# Patient Record
Sex: MALE | Race: WHITE | NOT HISPANIC OR LATINO | URBAN - NONMETROPOLITAN AREA
[De-identification: names, ages, dates, MRNs, and addresses within clinical notes are randomized per-mention and may not be internally consistent; named-entity substitution may affect disease eponyms.]

---

## 2017-09-05 ENCOUNTER — OFFICE VISIT (OUTPATIENT)
Dept: UROLOGY | Facility: CLINIC | Age: 42
End: 2017-09-05

## 2017-09-05 VITALS
WEIGHT: 267 LBS | DIASTOLIC BLOOD PRESSURE: 80 MMHG | TEMPERATURE: 97.2 F | HEIGHT: 68 IN | BODY MASS INDEX: 40.47 KG/M2 | SYSTOLIC BLOOD PRESSURE: 118 MMHG

## 2017-09-05 DIAGNOSIS — N40.1 BENIGN NON-NODULAR PROSTATIC HYPERPLASIA WITH LOWER URINARY TRACT SYMPTOMS: Primary | ICD-10-CM

## 2017-09-05 LAB
BILIRUB BLD-MCNC: NEGATIVE MG/DL
CLARITY, POC: CLEAR
COLOR UR: YELLOW
GLUCOSE UR STRIP-MCNC: NEGATIVE MG/DL
KETONES UR QL: NEGATIVE
LEUKOCYTE EST, POC: NEGATIVE
NITRITE UR-MCNC: NEGATIVE MG/ML
PH UR: 5.5 [PH] (ref 5–8)
PROT UR STRIP-MCNC: NEGATIVE MG/DL
RBC # UR STRIP: NEGATIVE /UL
SP GR UR: 1.02 (ref 1–1.03)
UROBILINOGEN UR QL: NORMAL

## 2017-09-05 PROCEDURE — 51798 US URINE CAPACITY MEASURE: CPT | Performed by: UROLOGY

## 2017-09-05 PROCEDURE — 81003 URINALYSIS AUTO W/O SCOPE: CPT | Performed by: UROLOGY

## 2017-09-05 PROCEDURE — 99204 OFFICE O/P NEW MOD 45 MIN: CPT | Performed by: UROLOGY

## 2017-09-05 PROCEDURE — 87086 URINE CULTURE/COLONY COUNT: CPT | Performed by: UROLOGY

## 2017-09-05 RX ORDER — TAMSULOSIN HYDROCHLORIDE 0.4 MG/1
1 CAPSULE ORAL DAILY
Qty: 30 CAPSULE | Refills: 11 | Status: SHIPPED | OUTPATIENT
Start: 2017-09-05

## 2017-09-05 NOTE — PROGRESS NOTES
Mr. Russell is 42 y.o. male    Chief Complaint   Patient presents with   • Urinary Frequency       Urinary Frequency    This is a new problem. The current episode started 1 to 4 weeks ago. The problem occurs intermittently. The problem has been unchanged. The patient is experiencing no pain. There has been no fever. He is sexually active. There is no history of pyelonephritis. Associated symptoms include frequency. Pertinent negatives include no flank pain, hematuria, nausea or vomiting. He has tried antibiotics for the symptoms. The treatment provided no relief. There is no history of kidney stones, recurrent UTIs or a urological procedure.       The following portions of the patient's history were reviewed and updated as appropriate: allergies, current medications, past family history, past medical history, past social history, past surgical history and problem list.    Review of Systems   Constitutional: Negative for appetite change and fever.   HENT: Negative for hearing loss and sore throat.    Eyes: Negative for pain and redness.   Respiratory: Negative for cough and shortness of breath.    Cardiovascular: Negative for chest pain and leg swelling.   Gastrointestinal: Negative for anal bleeding, nausea and vomiting.   Endocrine: Negative for cold intolerance and heat intolerance.   Genitourinary: Positive for frequency. Negative for dysuria, flank pain and hematuria.   Musculoskeletal: Negative for joint swelling and myalgias.   Skin: Negative for color change and rash.   Allergic/Immunologic: Negative for immunocompromised state.   Neurological: Negative for dizziness and speech difficulty.   Hematological: Negative for adenopathy. Does not bruise/bleed easily.   Psychiatric/Behavioral: Negative for dysphoric mood and suicidal ideas.         Current Outpatient Prescriptions:   •  metFORMIN (GLUCOPHAGE) 500 MG tablet, Take 500 mg by mouth 2 (Two) Times a Day With Meals., Disp: , Rfl:   •  tamsulosin (FLOMAX)  "0.4 MG capsule 24 hr capsule, Take 1 capsule by mouth Daily., Disp: 30 capsule, Rfl: 11    Past Medical History:   Diagnosis Date   • Diabetes mellitus    • Environmental allergies        History reviewed. No pertinent surgical history.    Social History     Social History   • Marital status:      Spouse name: N/A   • Number of children: N/A   • Years of education: N/A     Social History Main Topics   • Smoking status: Never Smoker   • Smokeless tobacco: Never Used   • Alcohol use No   • Drug use: None   • Sexual activity: Not Asked     Other Topics Concern   • None     Social History Narrative       Family History   Problem Relation Age of Onset   • No Known Problems Father    • No Known Problems Mother        Objective    /80  Temp 97.2 °F (36.2 °C)  Ht 68\" (172.7 cm)  Wt 267 lb (121 kg)  BMI 40.6 kg/m2    Physical Exam   Constitutional: He is oriented to person, place, and time. He appears well-developed and well-nourished. No distress.   HENT:   Nose: Nose normal.   Neck: Normal range of motion. Neck supple. No tracheal deviation present. No thyromegaly present.   Cardiovascular: Normal rate, regular rhythm and intact distal pulses.    No significant edema or tenderness    Pulmonary/Chest: Breath sounds normal. No accessory muscle usage. No respiratory distress.   Abdominal: Soft. Bowel sounds are normal. He exhibits no distension, no ascites and no mass. There is no hepatosplenomegaly. There is no tenderness. There is no rebound, no guarding and no CVA tenderness. No hernia.   Stool specimen is not indicated for my portion of the exam   Genitourinary: Penis normal. Rectal exam shows no mass, no tenderness, anal tone normal and guaiac negative stool. Tender: no nodules. Right testis shows no mass, no swelling and no tenderness. Left testis shows swelling (hydrocele). Left testis shows no mass and no tenderness. No penile tenderness (no lesion or deformities).   Genitourinary Comments:  The " urethral meatus normal in position without evidence of stricture. Epididymis without mass or tenderness. Vas Deferens is palpably normal.Anus and perineum without mass or tenderness. The seminal vesicle are without mass or enlargement. The prostate is approximately 40 ml. It is Symmetric, with a Soft consistency. There are no nodules present. .      Lymphadenopathy:     He has no cervical adenopathy. No inguinal adenopathy noted on the right or left side.        Right: No inguinal adenopathy present.        Left: No inguinal adenopathy present.   Neurological: He is alert and oriented to person, place, and time.   Skin: Skin is warm and dry. No rash noted. He is not diaphoretic. No pallor.   Psychiatric: He has a normal mood and affect. His behavior is normal.   Vitals reviewed.      Admission on 07/28/2017, Discharged on 07/28/2017   Component Date Value Ref Range Status   • Color 07/28/2017 Dark Yellow  Yellow, Straw, Dark Yellow, Esperanza Final   • Clarity, UA 07/28/2017 Clear  Clear Final   • Glucose, UA 07/28/2017 Negative  Negative, 1000 mg/dL (3+) mg/dL Final   • Bilirubin 07/28/2017 Small (1+)* Negative Final   • Ketones, UA 07/28/2017 Negative  Negative Final   • Specific Gravity  07/28/2017 1.030  1.005 - 1.030 Final   • Blood, UA 07/28/2017 Negative  Negative Final   • pH, Urine 07/28/2017 5.0  5.0 - 8.0 Final   • Protein, POC 07/28/2017 Trace* Negative mg/dL Final   • Urobilinogen, UA 07/28/2017 Normal  Normal Final   • Leukocytes 07/28/2017 Negative  Negative Final   • Nitrite, UA 07/28/2017 Negative  Negative Final       Results for orders placed or performed in visit on 09/05/17   POC Urinalysis Dipstick, Automated   Result Value Ref Range    Color Yellow Yellow, Straw, Dark Yellow, Esperanza    Clarity, UA Clear Clear    Glucose, UA Negative Negative, 1000 mg/dL (3+) mg/dL    Bilirubin Negative Negative    Ketones, UA Negative Negative    Specific Gravity  1.025 1.005 - 1.030    Blood, UA Negative Negative     pH, Urine 5.5 5.0 - 8.0    Protein, POC Negative Negative mg/dL    Urobilinogen, UA Normal Normal    Leukocytes Negative Negative    Nitrite, UA Negative Negative   Bladder Scan interpretation  Estimation of residual urine via abdominal ultrasound  Residual Urine: 0 ml  Indication: frequency  Position: Supine  Examination: Incremental scanning of the suprapubic area using 3 MHz transducer using copious amounts of acoustic gel.   Findings: An anechoic area was demonstrated which represented the bladder, with measurement of residual urine as noted. I inspected this myself. In that the residual urine was stable or insignificant, no treatment will be necessary at this time.       Assessment and Plan    Roberto was seen today for urinary frequency.    Diagnoses and all orders for this visit:    Benign non-nodular prostatic hyperplasia with lower urinary tract symptoms  -     POC Urinalysis Dipstick, Automated  -     tamsulosin (FLOMAX) 0.4 MG capsule 24 hr capsule; Take 1 capsule by mouth Daily.  -     Urine Culture    I reviewed his records from his urgent care visit.  He is having significant lower urinary tract symptoms.  Urinalysis was negative for infection.  He was started on antibiotics under the presumed diagnosis of prostatitis.    Urinalysis is negative today.  Urinalysis was negative at his urgent care visit.  I do not think that he had a diagnosis of acute prostatitis and the absence of positive urine culture or leukocyte esterase positive urinalysis.  I believe more than likely what he has is an enlarging prostate.  We discussed options and he is interested in starting Flomax.  I have written her prescription for this today.  I will also send a urine culture today.  He does states that he had an episode of erectile dysfunction last night.  Prior to this, he had never had problems with erections.  As this was a one-time incident, plan to continue to monitor.    He and I discussed BPH today including the  pathophysiology, diagnosis, and management. The role of PSA in management of PSA is discussed.  The patient understands the purpose of a uroflow, post void residual and the need for cystoscopy. We discussed the role of Alpha blockers, 5-Alpha redcuctase inhibitors, and anticholinergics. Minimally invasive techniques including TUNA, TUMT, Interstitial laser, and WIT are considered. TUIP, TURP, & Laser ablation are also explained as more invasive techniques. TURP is acknowledged to be the gold standard for surgical management. Behavioral, dietary, and herbal therapy are also discussed pointing out the limited available data for the latter. Based upon his symptomatology, we have elected to begin a trial of alpha blockade. The risks of orthostasis, central mediated dizziness, ejaculatory dysfunction, reflux, & rhinitis are discussed with the patient.

## 2017-09-07 LAB — BACTERIA SPEC AEROBE CULT: NORMAL

## 2019-10-14 ENCOUNTER — OFFICE VISIT (OUTPATIENT)
Dept: URGENT CARE | Facility: PHYSICIAN GROUP | Age: 44
End: 2019-10-14
Payer: COMMERCIAL

## 2019-10-14 VITALS
HEART RATE: 99 BPM | TEMPERATURE: 97 F | HEIGHT: 66 IN | BODY MASS INDEX: 41.78 KG/M2 | WEIGHT: 260 LBS | DIASTOLIC BLOOD PRESSURE: 84 MMHG | RESPIRATION RATE: 16 BRPM | SYSTOLIC BLOOD PRESSURE: 142 MMHG | OXYGEN SATURATION: 94 %

## 2019-10-14 DIAGNOSIS — L08.9 SOFT TISSUE INFECTION: ICD-10-CM

## 2019-10-14 PROCEDURE — 99203 OFFICE O/P NEW LOW 30 MIN: CPT | Performed by: FAMILY MEDICINE

## 2019-10-14 RX ORDER — ACETAMINOPHEN 500 MG
500-1000 TABLET ORAL EVERY 6 HOURS PRN
COMMUNITY
End: 2020-02-28

## 2019-10-14 RX ORDER — DOXYCYCLINE HYCLATE 100 MG
100 TABLET ORAL 2 TIMES DAILY
Qty: 14 TAB | Refills: 0 | Status: SHIPPED | OUTPATIENT
Start: 2019-10-14 | End: 2019-10-21

## 2019-10-14 ASSESSMENT — ENCOUNTER SYMPTOMS
EYE REDNESS: 0
EYE DISCHARGE: 0
WEIGHT LOSS: 0
MYALGIAS: 0
NAUSEA: 0
VOMITING: 0

## 2019-10-14 NOTE — PROGRESS NOTES
"Subjective:      Ji Montoya is a 44 y.o. male who presents with Facial Swelling (L [id number] )            3 days left facial swelling.  There is a red bump ? Ingrown hair. No drainage.  No toothache.  No fever.  No eye involvement. No rash. Sx's are moderate severity and progressively worse. No other aggravating or alleviating factors.        Review of Systems   Constitutional: Negative for malaise/fatigue and weight loss.   Eyes: Negative for discharge and redness.   Gastrointestinal: Negative for nausea and vomiting.   Musculoskeletal: Negative for joint pain and myalgias.   Skin: Negative for itching and rash.     .  Medications, Allergies, and current problem list reviewed today in Epic       Objective:     /84   Pulse 99   Temp 36.1 °C (97 °F) (Temporal)   Resp 16   Ht 1.676 m (5' 6\")   Wt 117.9 kg (260 lb)   SpO2 94%   BMI 41.97 kg/m²      Physical Exam   Constitutional: He is oriented to person, place, and time. He appears well-developed and well-nourished. No distress.   HENT:   Head: Normocephalic and atraumatic.       Eyes: Conjunctivae are normal.   Cardiovascular: Normal rate, regular rhythm and normal heart sounds.   No murmur heard.  Pulmonary/Chest: Effort normal and breath sounds normal. He has no wheezes.   Neurological: He is alert and oriented to person, place, and time.   Skin: Skin is warm and dry. No rash noted.             Assessment/Plan:     1. Soft tissue infection  doxycycline (VIBRAMYCIN) 100 MG Tab     Differential diagnosis, natural history, supportive care, and indications for immediate follow-up discussed at length.       "

## 2019-10-14 NOTE — LETTER
October 14, 2019         Patient: Ji Montoya   YOB: 1975   Date of Visit: 10/14/2019           To Whom it May Concern:    Ji Montoya was seen in my clinic on 10/14/2019. Please excuse today.       Sincerely,           Benedicto Baltazar M.D.  Electronically Signed

## 2019-12-16 ENCOUNTER — OFFICE VISIT (OUTPATIENT)
Dept: URGENT CARE | Facility: PHYSICIAN GROUP | Age: 44
End: 2019-12-16
Payer: COMMERCIAL

## 2019-12-16 ENCOUNTER — HOSPITAL ENCOUNTER (OUTPATIENT)
Dept: RADIOLOGY | Facility: MEDICAL CENTER | Age: 44
End: 2019-12-16
Attending: PHYSICIAN ASSISTANT
Payer: COMMERCIAL

## 2019-12-16 VITALS
RESPIRATION RATE: 18 BRPM | WEIGHT: 260 LBS | HEIGHT: 66 IN | OXYGEN SATURATION: 98 % | DIASTOLIC BLOOD PRESSURE: 82 MMHG | HEART RATE: 78 BPM | SYSTOLIC BLOOD PRESSURE: 118 MMHG | TEMPERATURE: 97.5 F | BODY MASS INDEX: 41.78 KG/M2

## 2019-12-16 DIAGNOSIS — R51.9 ACUTE NONINTRACTABLE HEADACHE, UNSPECIFIED HEADACHE TYPE: ICD-10-CM

## 2019-12-16 PROCEDURE — 70450 CT HEAD/BRAIN W/O DYE: CPT

## 2019-12-16 PROCEDURE — 99214 OFFICE O/P EST MOD 30 MIN: CPT | Performed by: PHYSICIAN ASSISTANT

## 2019-12-16 RX ORDER — KETOROLAC TROMETHAMINE 30 MG/ML
60 INJECTION, SOLUTION INTRAMUSCULAR; INTRAVENOUS ONCE
Status: COMPLETED | OUTPATIENT
Start: 2019-12-16 | End: 2019-12-16

## 2019-12-16 RX ORDER — NAPROXEN 500 MG/1
500 TABLET ORAL EVERY 6 HOURS PRN
Qty: 30 TAB | Refills: 0 | Status: SHIPPED | OUTPATIENT
Start: 2019-12-16 | End: 2020-02-28

## 2019-12-16 RX ADMIN — KETOROLAC TROMETHAMINE 60 MG: 30 INJECTION, SOLUTION INTRAMUSCULAR; INTRAVENOUS at 12:37

## 2019-12-16 ASSESSMENT — ENCOUNTER SYMPTOMS
SENSORY CHANGE: 0
SPEECH CHANGE: 0
ABDOMINAL PAIN: 0
COUGH: 0
BRUISES/BLEEDS EASILY: 0
BLURRED VISION: 0
WEAKNESS: 0
SEIZURES: 0
SPUTUM PRODUCTION: 0
VOMITING: 0
FEVER: 0
DIARRHEA: 0
FOCAL WEAKNESS: 0
CHILLS: 0
NAUSEA: 0
TINGLING: 0
DOUBLE VISION: 0
LOSS OF CONSCIOUSNESS: 0
SHORTNESS OF BREATH: 0
DIZZINESS: 0
HEADACHES: 1
WHEEZING: 0
TREMORS: 0

## 2019-12-16 ASSESSMENT — VISUAL ACUITY: OU: 1

## 2019-12-16 NOTE — PROGRESS NOTES
"Subjective:   Ji Montoya  is a 44 y.o. male who presents for Headache (x1wks soreness, throbbing, in the back of head, hx of head trama)        Headache    This is a new problem. The current episode started in the past 7 days. The problem occurs constantly. The problem has been waxing and waning. The pain is located in the occipital region. The pain does not radiate. Pertinent negatives include no abdominal pain, blurred vision, coughing, dizziness, fever, hearing loss, nausea, seizures, tingling, tinnitus, vomiting or weakness.     Patient comes clinic complaining of pain from headache on left occiput times last 1 week.  He denies treatments tried.  Denies over-the-counter medicines tried.  Denies feeling otherwise sick.  Denies fevers chills or nausea vomiting.  Denies dizziness or visual changes.  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  He notes he did have one severe injury where a horse fell on him across his torso.  He had significant rib fractures and reports having to be \"opened up on the back of my head\" to help with the swelling.  Patient denies any numbness tingling or weakness.  Denies any radiation of headache.  Notes focal location of pain to left occipital head.  Notes some relief with pushing pressure with his left hand in this area.  Denies feeling otherwise ill.  Denies cough sore throat or ear pain.  He notes past medical history of sinus congestion and seasonal allergies this time of year and suspects may be contributory.  Has tried no over-the-counter medicines because he \"did not want to mask anything abnormal with my brain\".    Review of Systems   Constitutional: Negative for chills, fever and malaise/fatigue.   HENT: Positive for congestion. Negative for hearing loss and tinnitus.    Eyes: Negative for blurred vision and double vision.   Respiratory: Negative for cough, sputum production, shortness of breath " "and wheezing.    Cardiovascular: Negative for chest pain.   Gastrointestinal: Negative for abdominal pain, diarrhea, nausea and vomiting.   Skin: Negative for rash.   Neurological: Positive for headaches. Negative for dizziness, tingling, tremors, sensory change, speech change, focal weakness, seizures, loss of consciousness and weakness.   Endo/Heme/Allergies: Positive for environmental allergies. Does not bruise/bleed easily.     No Known Allergies   I have worn a mask for the entire encounter with this patient.      Objective:   /82   Pulse 78   Temp 36.4 °C (97.5 °F) (Temporal)   Resp 18   Ht 1.676 m (5' 6\")   Wt 117.9 kg (260 lb)   SpO2 98%   BMI 41.97 kg/m²   Physical Exam  Vitals signs and nursing note reviewed.   Constitutional:       General: He is not in acute distress.     Appearance: He is well-developed. He is not diaphoretic.   HENT:      Head: Normocephalic and atraumatic.      Right Ear: Tympanic membrane, ear canal and external ear normal.      Left Ear: Tympanic membrane, ear canal and external ear normal.      Nose: Nose normal.      Mouth/Throat:      Pharynx: Uvula midline. Posterior oropharyngeal erythema ( mild PND) present. No oropharyngeal exudate.      Tonsils: No tonsillar abscesses.   Eyes:      General: Lids are normal. Vision grossly intact. Gaze aligned appropriately. No scleral icterus.        Right eye: No discharge or hordeolum.         Left eye: No discharge or hordeolum.      Extraocular Movements:      Right eye: Nystagmus present.      Left eye: Nystagmus present.      Conjunctiva/sclera: Conjunctivae normal.      Right eye: Right conjunctiva is not injected. No exudate or hemorrhage.     Left eye: Left conjunctiva is not injected. No exudate or hemorrhage.     Pupils: Pupils are equal, round, and reactive to light.   Neck:      Musculoskeletal: Neck supple.   Pulmonary:      Effort: Pulmonary effort is normal. No respiratory distress.      Breath sounds: No " decreased breath sounds, wheezing, rhonchi or rales.   Musculoskeletal: Normal range of motion.   Lymphadenopathy:      Cervical: Cervical adenopathy ( mild bilat) present.   Skin:     General: Skin is warm and dry.      Coloration: Skin is not pale.   Neurological:      Mental Status: He is alert and oriented to person, place, and time. He is not disoriented.      GCS: GCS eye subscore is 4. GCS verbal subscore is 5. GCS motor subscore is 6.      Cranial Nerves: Cranial nerves are intact. No cranial nerve deficit.      Sensory: Sensation is intact. No sensory deficit.      Motor: Motor function is intact.      Coordination: Coordination is intact. Coordination normal.      Gait: Gait is intact. Gait normal.     CT head  -   IMPRESSION:     Head CT without contrast within normal limits. No evidence of acute cerebral infarction, hemorrhage or mass lesion.             Last Resulted: 12/16/19 12:08 PM           Toradol 60 IM-tolerates well      Assessment/Plan:   1. Acute nonintractable headache, unspecified headache type  - CT-HEAD W/O; Future  - ketorolac (TORADOL) injection 60 mg  - naproxen (NAPROSYN) 500 MG Tab; Take 1 Tab by mouth every 6 hours as needed (pain or headache).  Dispense: 30 Tab; Refill: 0  - REFERRAL TO FAMILY PRACTICE  Recommend conservative care, rest, toradol and Rx naproxen, tylenol OTC -trial of treatment medications for headache-patient encouraged to observe for improvement of headache or persistence, encouraged follow-up with primary care return to clinic with worsening    Return to clinic with lack of resolution or progression of symptoms.    ER precautions with any worsening symptoms are reviewed with patient/caregiver and they do express understanding    Differential diagnosis, natural history, supportive care, and indications for immediate follow-up discussed.

## 2020-02-02 ENCOUNTER — HOSPITAL ENCOUNTER (OUTPATIENT)
Dept: RADIOLOGY | Facility: MEDICAL CENTER | Age: 45
End: 2020-02-02
Attending: NURSE PRACTITIONER
Payer: COMMERCIAL

## 2020-02-02 ENCOUNTER — OFFICE VISIT (OUTPATIENT)
Dept: URGENT CARE | Facility: PHYSICIAN GROUP | Age: 45
End: 2020-02-02
Payer: COMMERCIAL

## 2020-02-02 VITALS
HEART RATE: 102 BPM | TEMPERATURE: 97.5 F | BODY MASS INDEX: 40.82 KG/M2 | HEIGHT: 66 IN | OXYGEN SATURATION: 97 % | DIASTOLIC BLOOD PRESSURE: 94 MMHG | RESPIRATION RATE: 18 BRPM | WEIGHT: 254 LBS | SYSTOLIC BLOOD PRESSURE: 154 MMHG

## 2020-02-02 DIAGNOSIS — R05.9 COUGH: ICD-10-CM

## 2020-02-02 DIAGNOSIS — J18.9 PNEUMONIA OF RIGHT LOWER LOBE DUE TO INFECTIOUS ORGANISM: ICD-10-CM

## 2020-02-02 DIAGNOSIS — J45.41 MODERATE PERSISTENT ASTHMA WITH EXACERBATION: ICD-10-CM

## 2020-02-02 DIAGNOSIS — R06.02 SHORTNESS OF BREATH: ICD-10-CM

## 2020-02-02 PROCEDURE — 99214 OFFICE O/P EST MOD 30 MIN: CPT | Mod: 25 | Performed by: NURSE PRACTITIONER

## 2020-02-02 PROCEDURE — 71046 X-RAY EXAM CHEST 2 VIEWS: CPT

## 2020-02-02 PROCEDURE — 94640 AIRWAY INHALATION TREATMENT: CPT | Performed by: NURSE PRACTITIONER

## 2020-02-02 RX ORDER — DEXTROMETHORPHAN HYDROBROMIDE AND PROMETHAZINE HYDROCHLORIDE 15; 6.25 MG/5ML; MG/5ML
5 SYRUP ORAL EVERY 4 HOURS PRN
Qty: 100 ML | Refills: 0 | Status: SHIPPED | OUTPATIENT
Start: 2020-02-02 | End: 2020-02-09

## 2020-02-02 RX ORDER — ALBUTEROL SULFATE 90 UG/1
2 AEROSOL, METERED RESPIRATORY (INHALATION) EVERY 4 HOURS PRN
Qty: 1 INHALER | Refills: 0 | Status: SHIPPED | OUTPATIENT
Start: 2020-02-02 | End: 2020-02-16

## 2020-02-02 RX ORDER — ALBUTEROL SULFATE 2.5 MG/3ML
2.5 SOLUTION RESPIRATORY (INHALATION) ONCE
Status: COMPLETED | OUTPATIENT
Start: 2020-02-02 | End: 2020-02-02

## 2020-02-02 RX ORDER — PREDNISONE 20 MG/1
40 TABLET ORAL DAILY
Qty: 10 TAB | Refills: 0 | Status: SHIPPED | OUTPATIENT
Start: 2020-02-02 | End: 2020-02-07

## 2020-02-02 RX ORDER — DOXYCYCLINE HYCLATE 100 MG
100 TABLET ORAL 2 TIMES DAILY
Qty: 14 TAB | Refills: 0 | Status: SHIPPED | OUTPATIENT
Start: 2020-02-02 | End: 2020-02-09

## 2020-02-02 RX ADMIN — ALBUTEROL SULFATE 2.5 MG: 2.5 SOLUTION RESPIRATORY (INHALATION) at 11:20

## 2020-02-02 ASSESSMENT — ENCOUNTER SYMPTOMS
CHILLS: 0
DOUBLE VISION: 0
COUGH: 1
WHEEZING: 1
NAUSEA: 0
SHORTNESS OF BREATH: 1
HEARTBURN: 0
PALPITATIONS: 0
FEVER: 0
SPUTUM PRODUCTION: 1
BACK PAIN: 0
MYALGIAS: 0
ABDOMINAL PAIN: 0
HEMOPTYSIS: 0
BLURRED VISION: 0
VOMITING: 0
SORE THROAT: 0

## 2020-02-02 NOTE — PROGRESS NOTES
Subjective:     Ji Montoya is a 44 y.o. male who presents for Cough ([id number] )      HPI  Pt presents for evaluation of a new problem. He states he has been feeling ill for a week. His symptoms include SOB, productive brown/gree/yellow cough, dyspnea upon excertion, headache and congestion. He has tried using dayquil for his symptoms however this has provided no relief. His symptoms are progressively worsening. He has a 3 pack/day smoking history as well as Asthma which is untreated. He states he usually gets pneumonia every year requiring a hospital stay.     Review of Systems   Constitutional: Positive for malaise/fatigue. Negative for chills and fever.   HENT: Positive for congestion. Negative for ear discharge, ear pain and sore throat.    Eyes: Negative for blurred vision and double vision.   Respiratory: Positive for cough, sputum production, shortness of breath and wheezing. Negative for hemoptysis.    Cardiovascular: Positive for chest pain. Negative for palpitations.        Chest tenderness with coughing only   Gastrointestinal: Negative for abdominal pain, heartburn, nausea and vomiting.   Genitourinary: Negative for dysuria, frequency and urgency.   Musculoskeletal: Negative for back pain and myalgias.   Skin: Negative for itching and rash.   All other systems reviewed and are negative.      PMH: No past medical history on file.  ALLERGIES: No Known Allergies  SURGHX: No past surgical history on file.  SOCHX:   Social History     Socioeconomic History   • Marital status:      Spouse name: Not on file   • Number of children: Not on file   • Years of education: Not on file   • Highest education level: Not on file   Occupational History   • Not on file   Social Needs   • Financial resource strain: Not on file   • Food insecurity:     Worry: Not on file     Inability: Not on file   • Transportation needs:     Medical: Not on file     Non-medical: Not on file   Tobacco Use   • Smoking  "status: Former Smoker     Types: Cigarettes   • Smokeless tobacco: Former User     Quit date: 2016   • Tobacco comment: quit 8 years ago    Substance and Sexual Activity   • Alcohol use: Not Currently   • Drug use: Not on file   • Sexual activity: Not on file   Lifestyle   • Physical activity:     Days per week: Not on file     Minutes per session: Not on file   • Stress: Not on file   Relationships   • Social connections:     Talks on phone: Not on file     Gets together: Not on file     Attends [employer] service: Not on file     Active member of club or organization: Not on file     Attends meetings of clubs or organizations: Not on file     Relationship status: Not on file   • Intimate partner violence:     Fear of current or ex partner: Not on file     Emotionally abused: Not on file     Physically abused: Not on file     Forced sexual activity: Not on file   Other Topics Concern   • Not on file   Social History Narrative   • Not on file     FH: No family history on file.      Objective:   /94   Pulse (!) 102   Temp 36.4 °C (97.5 °F) (Temporal)   Resp 18   Ht 1.676 m (5' 6\")   Wt 115.2 kg (254 lb)   SpO2 97%   BMI 41.00 kg/m²     Physical Exam  Vitals signs and nursing note reviewed.   Constitutional:       General: He is not in acute distress.     Appearance: He is ill-appearing.   HENT:      Head: Normocephalic and atraumatic.      Right Ear: Tympanic membrane normal.      Left Ear: Tympanic membrane normal.      Nose: Congestion and rhinorrhea present.      Mouth/Throat:      Mouth: Mucous membranes are moist.      Pharynx: No oropharyngeal exudate or posterior oropharyngeal erythema.   Eyes:      Extraocular Movements: Extraocular movements intact.      Conjunctiva/sclera: Conjunctivae normal.      Pupils: Pupils are equal, round, and reactive to light.   Neck:      Musculoskeletal: No neck rigidity or muscular tenderness.   Cardiovascular:      Rate and Rhythm: Normal rate and regular rhythm. "      Heart sounds: Normal heart sounds.   Pulmonary:      Effort: Tachypnea and prolonged expiration present.      Breath sounds: Decreased air movement and transmitted upper airway sounds present. Examination of the right-upper field reveals wheezing and rhonchi. Examination of the left-upper field reveals wheezing and rhonchi. Examination of the right-middle field reveals wheezing and rhonchi. Examination of the left-middle field reveals wheezing and rhonchi. Examination of the right-lower field reveals decreased breath sounds, wheezing and rhonchi. Examination of the left-lower field reveals decreased breath sounds, wheezing and rhonchi. Decreased breath sounds, wheezing and rhonchi present.      Comments: Wheezing and rhonci improved following albuterol neb in office however pt remains wheezing in bilateral upper lungs.   Chest:      Chest wall: Tenderness present.   Abdominal:      General: Abdomen is flat. There is no distension.      Palpations: Abdomen is soft.      Tenderness: There is no tenderness.   Musculoskeletal: Normal range of motion.   Lymphadenopathy:      Cervical: Cervical adenopathy present.   Skin:     General: Skin is warm and dry.      Capillary Refill: Capillary refill takes less than 2 seconds.   Neurological:      General: No focal deficit present.      Mental Status: He is alert and oriented to person, place, and time. Mental status is at baseline.   Psychiatric:         Mood and Affect: Mood normal.         Behavior: Behavior normal.         Judgment: Judgment normal.       DX chest 2 views: FINDINGS:  Low lung volume. Elevation of the right hemidiaphragm.  Patchy right basilar opacity.  No pleural effusions, no pneumothorax are appreciated.  Normal cardiopericardial silhouette.        IMPRESSION:        1. Low lung volume with hypoventilatory change.     2. Elevation of the right hemidiaphragm and right basilar opacity, likely atelectasis.  Assessment/Plan:   Assessment      1.  Shortness of breath  - albuterol (PROVENTIL) 2.5mg/3ml nebulizer solution 2.5 mg  - DX-CHEST-2 VIEWS; Future  - albuterol 108 (90 Base) MCG/ACT Aero Soln inhalation aerosol; Inhale 2 Puffs by mouth every four hours as needed for Shortness of Breath for up to 14 days.  Dispense: 1 Inhaler; Refill: 0  - promethazine-dextromethorphan (PROMETHAZINE-DM) 6.25-15 MG/5ML syrup; Take 5 mL by mouth every four hours as needed for Cough for up to 7 days.  Dispense: 100 mL; Refill: 0  - predniSONE (DELTASONE) 20 MG Tab; Take 2 Tabs by mouth every day for 5 days.  Dispense: 10 Tab; Refill: 0    2. Cough  - predniSONE (DELTASONE) 20 MG Tab; Take 2 Tabs by mouth every day for 5 days.  Dispense: 10 Tab; Refill: 0    We discussed supportive measures including humidifier, warm salt water gargles, over-the-counter Cepacol throat lozenges, rest  and increased fluids. He was encouraged to seek treatment back in the ER or urgent care for worsening symptoms,  fever greater than 101, worsening wheezes or shortness of breath. He is in agreement with his plan of care today.

## 2020-02-02 NOTE — LETTER
February 2, 2020         Patient: Ji Montoya   YOB: 1975   Date of Visit: 2/2/2020           To Whom it May Concern:    Ji Montoya was seen in my clinic on 2/2/2020. He may return to school on 02/04/2020.    If you have any questions or concerns, please don't hesitate to call.        Sincerely,           ANGIE Oviedo.  Electronically Signed

## 2020-02-02 NOTE — LETTER
February 2, 2020    To Whom It May Concern:         This is confirmation that Ji Jan attended his scheduled appointment with BHARAT Oviedo on 2/02/20. He may return to work 02/05/2020 or sooner if better.          If you have any questions please do not hesitate to call me at the phone number listed below.    Sincerely,          ANGIE Oviedo.  [phone]

## 2020-02-05 ENCOUNTER — OFFICE VISIT (OUTPATIENT)
Dept: URGENT CARE | Facility: PHYSICIAN GROUP | Age: 45
End: 2020-02-05
Payer: COMMERCIAL

## 2020-02-05 VITALS
TEMPERATURE: 98.8 F | BODY MASS INDEX: 40.82 KG/M2 | RESPIRATION RATE: 16 BRPM | HEART RATE: 107 BPM | OXYGEN SATURATION: 97 % | WEIGHT: 254 LBS | SYSTOLIC BLOOD PRESSURE: 108 MMHG | DIASTOLIC BLOOD PRESSURE: 64 MMHG | HEIGHT: 66 IN

## 2020-02-05 DIAGNOSIS — J18.9 PNEUMONIA OF RIGHT LOWER LOBE DUE TO INFECTIOUS ORGANISM: ICD-10-CM

## 2020-02-05 PROCEDURE — 94640 AIRWAY INHALATION TREATMENT: CPT | Performed by: PHYSICIAN ASSISTANT

## 2020-02-05 PROCEDURE — 99214 OFFICE O/P EST MOD 30 MIN: CPT | Mod: 25 | Performed by: PHYSICIAN ASSISTANT

## 2020-02-05 RX ORDER — IPRATROPIUM BROMIDE AND ALBUTEROL SULFATE 2.5; .5 MG/3ML; MG/3ML
3 SOLUTION RESPIRATORY (INHALATION) ONCE
Status: COMPLETED | OUTPATIENT
Start: 2020-02-05 | End: 2020-02-05

## 2020-02-05 RX ORDER — AMOXICILLIN AND CLAVULANATE POTASSIUM 875; 125 MG/1; MG/1
1 TABLET, FILM COATED ORAL 2 TIMES DAILY
Qty: 10 TAB | Refills: 0 | Status: SHIPPED | OUTPATIENT
Start: 2020-02-05 | End: 2020-02-10

## 2020-02-05 RX ORDER — BENZONATATE 100 MG/1
100 CAPSULE ORAL 3 TIMES DAILY PRN
COMMUNITY
Start: 2015-05-14 | End: 2020-04-06

## 2020-02-05 RX ADMIN — IPRATROPIUM BROMIDE AND ALBUTEROL SULFATE 3 ML: 2.5; .5 SOLUTION RESPIRATORY (INHALATION) at 16:54

## 2020-02-06 ASSESSMENT — ENCOUNTER SYMPTOMS
HEMOPTYSIS: 0
COUGH: 1
ABDOMINAL PAIN: 0
SORE THROAT: 1
MUSCULOSKELETAL NEGATIVE: 1
DIARRHEA: 0
SHORTNESS OF BREATH: 1
SPUTUM PRODUCTION: 0
WHEEZING: 1
FEVER: 0
NAUSEA: 0
CHILLS: 1
EYE REDNESS: 0
VOMITING: 0
EYE DISCHARGE: 0
MYALGIAS: 0
DIZZINESS: 0

## 2020-02-06 ASSESSMENT — COPD QUESTIONNAIRES: COPD: 1

## 2020-02-06 NOTE — PROGRESS NOTES
"Subjective:      Ji Montoya is a 44 y.o. male who presents with Cough (follow up pneumonia, still coughing)            Cough   This is a new problem. The current episode started 1 to 4 weeks ago (10 days). The problem has been unchanged. The problem occurs every few minutes. The cough is non-productive. Associated symptoms include chills, nasal congestion, postnasal drip, a sore throat, shortness of breath and wheezing. Pertinent negatives include no chest pain, ear pain, eye redness, fever, hemoptysis, myalgias or rash. The symptoms are aggravated by lying down. He has tried prescription cough suppressant and a beta-agonist inhaler for the symptoms. The treatment provided mild relief. His past medical history is significant for COPD and pneumonia. There is no history of asthma or bronchitis.     Patient was seen in urgent care 3 days ago and diagnosed with pneumonia, treated with a course of doxycycline and prednisone. He returns today reporting ongoing coughing and SOB. No recent fevers, chills, body aches, chest pain, productive cough, or hemoptysis. He reports he usually gets pneumonia once a year. He is a former smoker, quit many years ago.     Review of Systems   Constitutional: Positive for chills. Negative for fever.   HENT: Positive for congestion, postnasal drip and sore throat. Negative for ear pain.    Eyes: Negative for discharge and redness.   Respiratory: Positive for cough, shortness of breath and wheezing. Negative for hemoptysis and sputum production.    Cardiovascular: Negative for chest pain.   Gastrointestinal: Negative for abdominal pain, diarrhea, nausea and vomiting.   Genitourinary: Negative.    Musculoskeletal: Negative.  Negative for myalgias.   Skin: Negative for rash.   Neurological: Negative for dizziness.        Objective:     /64   Pulse (!) 107   Temp 37.1 °C (98.8 °F)   Resp 16   Ht 1.676 m (5' 6\")   Wt 115.2 kg (254 lb)   SpO2 97%   BMI 41.00 kg/m²      Physical " Exam  Vitals signs and nursing note reviewed.   Constitutional:       Appearance: Normal appearance. He is well-developed. He is not ill-appearing.   HENT:      Head: Normocephalic and atraumatic.      Right Ear: Hearing, tympanic membrane, ear canal and external ear normal. There is no impacted cerumen.      Left Ear: Hearing, tympanic membrane, ear canal and external ear normal. There is no impacted cerumen.      Nose: Rhinorrhea present. No congestion.      Mouth/Throat:      Mouth: Mucous membranes are moist.      Pharynx: Uvula midline. No oropharyngeal exudate or posterior oropharyngeal erythema.   Eyes:      General:         Right eye: No discharge.         Left eye: No discharge.      Conjunctiva/sclera: Conjunctivae normal.      Pupils: Pupils are equal, round, and reactive to light.   Neck:      Musculoskeletal: Normal range of motion.   Cardiovascular:      Rate and Rhythm: Normal rate and regular rhythm.      Heart sounds: Normal heart sounds. No murmur.   Pulmonary:      Effort: Pulmonary effort is normal.      Breath sounds: Wheezing present. No rales.      Comments: Dry cough present throughout exam  Musculoskeletal: Normal range of motion.   Skin:     General: Skin is warm and dry.   Neurological:      Mental Status: He is alert and oriented to person, place, and time.   Psychiatric:         Behavior: Behavior normal.            PMH:  has no past medical history on file.  MEDS:   Current Outpatient Medications:   •  benzonatate (TESSALON) 100 MG Cap, Take 100 mg by mouth., Disp: , Rfl:   •  amoxicillin-clavulanate (AUGMENTIN) 875-125 MG Tab, Take 1 Tab by mouth 2 times a day for 5 days., Disp: 10 Tab, Rfl: 0  •  albuterol 108 (90 Base) MCG/ACT Aero Soln inhalation aerosol, Inhale 2 Puffs by mouth every four hours as needed for Shortness of Breath for up to 14 days., Disp: 1 Inhaler, Rfl: 0  •  predniSONE (DELTASONE) 20 MG Tab, Take 2 Tabs by mouth every day for 5 days., Disp: 10 Tab, Rfl: 0  •   doxycycline (VIBRAMYCIN) 100 MG Tab, Take 1 Tab by mouth 2 times a day for 7 days., Disp: 14 Tab, Rfl: 0  •  promethazine-dextromethorphan (PROMETHAZINE-DM) 6.25-15 MG/5ML syrup, Take 5 mL by mouth every four hours as needed for Cough for up to 7 days. (Patient not taking: Reported on 2/5/2020), Disp: 100 mL, Rfl: 0  •  naproxen (NAPROSYN) 500 MG Tab, Take 1 Tab by mouth every 6 hours as needed (pain or headache). (Patient not taking: Reported on 2/5/2020), Disp: 30 Tab, Rfl: 0  •  acetaminophen (TYLENOL) 500 MG Tab, Take 500-1,000 mg by mouth every 6 hours as needed., Disp: , Rfl:   ALLERGIES: No Known Allergies  SURGHX: History reviewed. No pertinent surgical history.  SOCHX:  reports that he has quit smoking. His smoking use included cigarettes. He quit smokeless tobacco use about 4 years ago. He reports previous alcohol use.  FH: family history is not on file.       Assessment/Plan:       1. Pneumonia of right lower lobe due to infectious organism (HCC)  - ipratropium-albuterol (DUONEB) nebulizer solution   - Given in urgent care with minimal relief of cough and SOB  - amoxicillin-clavulanate (AUGMENTIN) 875-125 MG Tab; Take 1 Tab by mouth 2 times a day for 5 days.  Dispense: 10 Tab; Refill: 0   - Complete full course of antibiotics as prescribed     Encouraged increased fluids and rest. Continue current course of doxycyline and prednisone as instructed. Encouraged continued use of nebulizer at home for SOB. He has promethazine cough syrup to take as needed for symptomatic relief. Monitor closely and RTC or go to the ED for any persistent/worsening symptoms. The patient demonstrated a good understanding and agreed with the treatment plan.

## 2020-02-07 ENCOUNTER — OFFICE VISIT (OUTPATIENT)
Dept: URGENT CARE | Facility: PHYSICIAN GROUP | Age: 45
End: 2020-02-07
Payer: COMMERCIAL

## 2020-02-07 VITALS
OXYGEN SATURATION: 97 % | RESPIRATION RATE: 15 BRPM | HEIGHT: 66 IN | DIASTOLIC BLOOD PRESSURE: 80 MMHG | HEART RATE: 87 BPM | WEIGHT: 254 LBS | TEMPERATURE: 98.3 F | SYSTOLIC BLOOD PRESSURE: 122 MMHG | BODY MASS INDEX: 40.82 KG/M2

## 2020-02-07 DIAGNOSIS — J18.9 PNEUMONIA DUE TO INFECTIOUS ORGANISM, UNSPECIFIED LATERALITY, UNSPECIFIED PART OF LUNG: ICD-10-CM

## 2020-02-07 PROCEDURE — 99214 OFFICE O/P EST MOD 30 MIN: CPT | Performed by: PHYSICIAN ASSISTANT

## 2020-02-07 ASSESSMENT — ENCOUNTER SYMPTOMS
FEVER: 0
WHEEZING: 0
COUGH: 1
SORE THROAT: 0
PALPITATIONS: 0
SHORTNESS OF BREATH: 0
SPUTUM PRODUCTION: 1
CHILLS: 0
HEMOPTYSIS: 0

## 2020-02-07 NOTE — LETTER
February 7, 2020         Patient: Ji Montoya   YOB: 1975   Date of Visit: 2/7/2020           To Whom it May Concern:    Ji Montoya was seen in my clinic on 2/7/2020. He may return to work 2/8/2020.  If you have any questions or concerns, please don't hesitate to call.        Sincerely,           Phillip Sosa P.A.-C.  Electronically Signed

## 2020-02-07 NOTE — PROGRESS NOTES
Subjective:      Ji Montoya is a 44 y.o. male who presents with Letter for School/Work            Patient is a 44-year-old male who presents for reevaluation of pneumonia.  He was diagnosed with pneumonia several days ago.  He states that he is feeling better and would like note to go back to work.      Review of Systems   Constitutional: Negative for chills, fever and malaise/fatigue.   HENT: Negative for congestion, ear pain and sore throat.    Respiratory: Positive for cough and sputum production. Negative for hemoptysis, shortness of breath and wheezing.    Cardiovascular: Negative for chest pain and palpitations.   All other systems reviewed and are negative.    PMH:  has no past medical history on file.  MEDS:   Current Outpatient Medications:   •  albuterol 108 (90 Base) MCG/ACT Aero Soln inhalation aerosol, Inhale 2 Puffs by mouth every four hours as needed for Shortness of Breath for up to 14 days., Disp: 1 Inhaler, Rfl: 0  •  predniSONE (DELTASONE) 20 MG Tab, Take 2 Tabs by mouth every day for 5 days., Disp: 10 Tab, Rfl: 0  •  doxycycline (VIBRAMYCIN) 100 MG Tab, Take 1 Tab by mouth 2 times a day for 7 days., Disp: 14 Tab, Rfl: 0  •  benzonatate (TESSALON) 100 MG Cap, Take 100 mg by mouth., Disp: , Rfl:   •  amoxicillin-clavulanate (AUGMENTIN) 875-125 MG Tab, Take 1 Tab by mouth 2 times a day for 5 days. (Patient not taking: Reported on 2/7/2020), Disp: 10 Tab, Rfl: 0  •  promethazine-dextromethorphan (PROMETHAZINE-DM) 6.25-15 MG/5ML syrup, Take 5 mL by mouth every four hours as needed for Cough for up to 7 days. (Patient not taking: Reported on 2/5/2020), Disp: 100 mL, Rfl: 0  •  naproxen (NAPROSYN) 500 MG Tab, Take 1 Tab by mouth every 6 hours as needed (pain or headache). (Patient not taking: Reported on 2/5/2020), Disp: 30 Tab, Rfl: 0  •  acetaminophen (TYLENOL) 500 MG Tab, Take 500-1,000 mg by mouth every 6 hours as needed., Disp: , Rfl:   ALLERGIES: No Known Allergies  SURGHX: History reviewed.  "No pertinent surgical history.  SOCHX:  reports that he has quit smoking. His smoking use included cigarettes. He quit smokeless tobacco use about 4 years ago. He reports previous alcohol use.  FH: Family history was reviewed, no pertinent findings to report  Medications, Allergies, and current problem list reviewed today in Epic       Objective:     Blood Pressure 122/80   Pulse 87   Temperature 36.8 °C (98.3 °F)   Respiration 15   Height 1.676 m (5' 6\")   Weight 115.2 kg (254 lb)   Oxygen Saturation 97%   Body Mass Index 41.00 kg/m²      Physical Exam  Vitals signs reviewed.   Constitutional:       General: He is not in acute distress.     Appearance: He is well-developed. He is not ill-appearing or toxic-appearing.      Interventions: He is not intubated.  HENT:      Head: Normocephalic and atraumatic.      Right Ear: Hearing, tympanic membrane, ear canal and external ear normal.      Left Ear: Hearing, tympanic membrane, ear canal and external ear normal.      Nose: Nose normal.      Mouth/Throat:      Pharynx: Uvula midline.   Eyes:      General: Lids are normal.      Conjunctiva/sclera: Conjunctivae normal.   Neck:      Musculoskeletal: Full passive range of motion without pain, normal range of motion and neck supple.   Cardiovascular:      Rate and Rhythm: Regular rhythm.      Heart sounds: Normal heart sounds, S1 normal and S2 normal. No murmur. No friction rub. No gallop.    Pulmonary:      Effort: Pulmonary effort is normal. No tachypnea, bradypnea, accessory muscle usage or respiratory distress. He is not intubated.      Breath sounds: No stridor. No decreased breath sounds, wheezing, rhonchi or rales.   Chest:      Chest wall: No tenderness.   Musculoskeletal: Normal range of motion.   Skin:     General: Skin is warm and dry.   Neurological:      Mental Status: He is alert and oriented to person, place, and time.   Psychiatric:         Speech: Speech normal.         Behavior: Behavior normal.    "      Thought Content: Thought content normal.         Judgment: Judgment normal.                 Assessment/Plan:       1. Pneumonia due to infectious organism, unspecified laterality, unspecified part of lung  - cont antibiotics  - work note    Differential diagnosis, natural history, supportive care discussed. Follow-up with primary care provider within 7-10 days, emergency room precautions discussed.  Patient and/or family appears understanding of information.  Handout and review of patients diagnosis and treatment was discussed extensively.

## 2020-02-25 ENCOUNTER — HOSPITAL ENCOUNTER (OUTPATIENT)
Dept: RADIOLOGY | Facility: MEDICAL CENTER | Age: 45
End: 2020-02-25
Attending: PHYSICIAN ASSISTANT
Payer: COMMERCIAL

## 2020-02-25 ENCOUNTER — OFFICE VISIT (OUTPATIENT)
Dept: URGENT CARE | Facility: PHYSICIAN GROUP | Age: 45
End: 2020-02-25
Payer: COMMERCIAL

## 2020-02-25 VITALS
WEIGHT: 254 LBS | DIASTOLIC BLOOD PRESSURE: 80 MMHG | HEART RATE: 95 BPM | OXYGEN SATURATION: 96 % | RESPIRATION RATE: 16 BRPM | HEIGHT: 65 IN | SYSTOLIC BLOOD PRESSURE: 118 MMHG | TEMPERATURE: 97.5 F | BODY MASS INDEX: 42.32 KG/M2

## 2020-02-25 DIAGNOSIS — J18.9 COMMUNITY ACQUIRED PNEUMONIA OF RIGHT LUNG, UNSPECIFIED PART OF LUNG: ICD-10-CM

## 2020-02-25 DIAGNOSIS — R05.9 COUGH: ICD-10-CM

## 2020-02-25 DIAGNOSIS — J98.01 BRONCHOSPASM: ICD-10-CM

## 2020-02-25 PROCEDURE — 99214 OFFICE O/P EST MOD 30 MIN: CPT | Performed by: PHYSICIAN ASSISTANT

## 2020-02-25 PROCEDURE — 71046 X-RAY EXAM CHEST 2 VIEWS: CPT

## 2020-02-25 RX ORDER — AMOXICILLIN AND CLAVULANATE POTASSIUM 875; 125 MG/1; MG/1
1 TABLET, FILM COATED ORAL 2 TIMES DAILY
Qty: 10 TAB | Refills: 0 | Status: SHIPPED | OUTPATIENT
Start: 2020-02-25 | End: 2020-02-28

## 2020-02-25 RX ORDER — BENZONATATE 100 MG/1
100 CAPSULE ORAL 3 TIMES DAILY PRN
Qty: 60 CAP | Refills: 0 | Status: SHIPPED | OUTPATIENT
Start: 2020-02-25 | End: 2020-02-28

## 2020-02-25 RX ORDER — PROMETHAZINE HYDROCHLORIDE AND CODEINE PHOSPHATE 6.25; 1 MG/5ML; MG/5ML
5 SYRUP ORAL EVERY 12 HOURS PRN
Qty: 70 ML | Refills: 0 | Status: SHIPPED | OUTPATIENT
Start: 2020-02-25 | End: 2020-02-28

## 2020-02-25 RX ORDER — METHYLPREDNISOLONE 4 MG/1
TABLET ORAL
Qty: 21 TAB | Refills: 0 | Status: ON HOLD | OUTPATIENT
Start: 2020-02-25 | End: 2020-03-02

## 2020-02-25 RX ORDER — DOXYCYCLINE 100 MG/1
100 CAPSULE ORAL 2 TIMES DAILY
Qty: 10 CAP | Refills: 0 | Status: SHIPPED | OUTPATIENT
Start: 2020-02-25 | End: 2020-02-28

## 2020-02-25 ASSESSMENT — ENCOUNTER SYMPTOMS
SPUTUM PRODUCTION: 0
COUGH: 1
SHORTNESS OF BREATH: 0
WHEEZING: 1
CHILLS: 1
NAUSEA: 0
SORE THROAT: 0
VOMITING: 0
MYALGIAS: 1
DIARRHEA: 0
FEVER: 0
ABDOMINAL PAIN: 0
PALPITATIONS: 0

## 2020-02-25 NOTE — PROGRESS NOTES
"Subjective:   Ji Montoya  is a 45 y.o. male who presents for Cough (ongoing cough and co ngestion)        Patient comes clinic complaining of return of cough over the last 1 week.  Notes had been treated for lower respiratory tract infection/walking pneumonia around 3 to 4 weeks ago.  Patient noted significant improvement initially.  Over the last 7 days he has had some return of fatigue in the last 5 days significant return of coughing.  Notes cough with production as well as dry spastic coughing.  Notes no benefit from nebulizer treatments in the past and declines this today.  Denies nausea vomiting abdominal pain diarrhea or rash.  Has tried over-the-counter decongestion and cough medicines.  Patient has an established with primary care appointment next month    Review of Systems   Constitutional: Positive for chills. Negative for fever.   HENT: Positive for congestion ( chest). Negative for ear pain and sore throat.    Respiratory: Positive for cough and wheezing. Negative for sputum production and shortness of breath.    Cardiovascular: Negative for chest pain, palpitations and leg swelling.   Gastrointestinal: Negative for abdominal pain, diarrhea, nausea and vomiting.   Musculoskeletal: Positive for myalgias.   Skin: Negative for rash.     No Known Allergies   I have worn a mask for the entire encounter with this patient.    Objective:   /80   Pulse 95   Temp 36.4 °C (97.5 °F)   Resp 16   Ht 1.651 m (5' 5\")   Wt 115.2 kg (254 lb)   SpO2 96%   BMI 42.27 kg/m²   Physical Exam  Vitals signs and nursing note reviewed.   Constitutional:       General: He is not in acute distress.     Appearance: He is well-developed. He is not diaphoretic.   HENT:      Head: Normocephalic and atraumatic.      Right Ear: Tympanic membrane, ear canal and external ear normal.      Left Ear: Tympanic membrane, ear canal and external ear normal.      Nose: Nose normal.      Mouth/Throat:      Pharynx: Uvula midline. " Posterior oropharyngeal erythema ( mild PND) present. No oropharyngeal exudate.      Tonsils: No tonsillar abscesses.   Eyes:      General: No scleral icterus.        Right eye: No discharge.         Left eye: No discharge.      Conjunctiva/sclera: Conjunctivae normal.   Neck:      Musculoskeletal: Neck supple.   Pulmonary:      Effort: Pulmonary effort is normal. No accessory muscle usage or respiratory distress.      Breath sounds: No stridor. Wheezing present. No decreased breath sounds, rhonchi or rales.   Musculoskeletal: Normal range of motion.   Lymphadenopathy:      Cervical: Cervical adenopathy ( mild bilat) present.   Skin:     General: Skin is warm and dry.      Coloration: Skin is not pale.   Neurological:      Mental Status: He is alert and oriented to person, place, and time.      Coordination: Coordination normal.     CXR -   IMPRESSION:     Mild airspace process within the lingula consistent with pneumonia or atelectasis             Last Resulted: 02/25/20 10:06 AM               Assessment/Plan:   1. Community acquired pneumonia of right lung, unspecified part of lung  - amoxicillin-clavulanate (AUGMENTIN) 875-125 MG Tab; Take 1 Tab by mouth 2 times a day for 5 days.  Dispense: 10 Tab; Refill: 0  - doxycycline (MONODOX) 100 MG capsule; Take 1 Cap by mouth 2 times a day for 5 days.  Dispense: 10 Cap; Refill: 0    2. Cough  - DX-CHEST-2 VIEWS; Future  - benzonatate (TESSALON) 100 MG Cap; Take 1 Cap by mouth 3 times a day as needed for Cough.  Dispense: 60 Cap; Refill: 0  - promethazine-codeine (PHENERGAN-CODEINE) 6.25-10 MG/5ML Syrup; Take 5 mL by mouth every 12 hours as needed for up to 7 days.  Dispense: 70 mL; Refill: 0    3. Bronchospasm  - methylPREDNISolone (MEDROL DOSEPAK) 4 MG Tablet Therapy Pack; Follow schedule on package instructions.  Dispense: 21 Tab; Refill: 0  Supportive care is reviewed with patient/caregiver - recommend to push PO fluids and electrolytes, Nsaids/tylenol, netti  pot/saline irrig, humidifier in home,  take full course of Rx, take with probiotics, observe for resolution  Return to clinic with lack of resolution or progression of symptoms.  Medrol, Cautioned regarding potential side effects of steroid, avoid nsaids while using  Cautioned regarding potential for sedation with medication.  ER precautions with any worsening symptoms are reviewed with patient/caregiver and they do express understanding    Differential diagnosis, natural history, supportive care, and indications for immediate follow-up discussed.

## 2020-02-28 ENCOUNTER — OFFICE VISIT (OUTPATIENT)
Dept: URGENT CARE | Facility: PHYSICIAN GROUP | Age: 45
End: 2020-02-28
Payer: COMMERCIAL

## 2020-02-28 ENCOUNTER — APPOINTMENT (OUTPATIENT)
Dept: RADIOLOGY | Facility: MEDICAL CENTER | Age: 45
DRG: 871 | End: 2020-02-28
Attending: HOSPITALIST
Payer: COMMERCIAL

## 2020-02-28 ENCOUNTER — HOSPITAL ENCOUNTER (INPATIENT)
Facility: MEDICAL CENTER | Age: 45
LOS: 3 days | DRG: 871 | End: 2020-03-02
Attending: EMERGENCY MEDICINE | Admitting: HOSPITALIST
Payer: COMMERCIAL

## 2020-02-28 ENCOUNTER — HOSPITAL ENCOUNTER (OUTPATIENT)
Dept: LAB | Facility: MEDICAL CENTER | Age: 45
End: 2020-02-28
Attending: PHYSICIAN ASSISTANT
Payer: COMMERCIAL

## 2020-02-28 VITALS
HEIGHT: 65 IN | WEIGHT: 260 LBS | HEART RATE: 94 BPM | TEMPERATURE: 97.2 F | SYSTOLIC BLOOD PRESSURE: 118 MMHG | BODY MASS INDEX: 43.32 KG/M2 | OXYGEN SATURATION: 97 % | RESPIRATION RATE: 22 BRPM | DIASTOLIC BLOOD PRESSURE: 86 MMHG

## 2020-02-28 DIAGNOSIS — J18.9 COMMUNITY ACQUIRED PNEUMONIA, UNSPECIFIED LATERALITY: ICD-10-CM

## 2020-02-28 DIAGNOSIS — Z86.19 HISTORY OF SEPSIS: ICD-10-CM

## 2020-02-28 DIAGNOSIS — E11.69 TYPE 2 DIABETES MELLITUS WITH OTHER SPECIFIED COMPLICATION, WITHOUT LONG-TERM CURRENT USE OF INSULIN (HCC): ICD-10-CM

## 2020-02-28 DIAGNOSIS — J45.901 MILD ASTHMA WITH ACUTE EXACERBATION, UNSPECIFIED WHETHER PERSISTENT: ICD-10-CM

## 2020-02-28 DIAGNOSIS — A41.9 SEPSIS WITHOUT ACUTE ORGAN DYSFUNCTION, DUE TO UNSPECIFIED ORGANISM (HCC): ICD-10-CM

## 2020-02-28 DIAGNOSIS — J18.9 PNEUMONIA OF RIGHT MIDDLE LOBE DUE TO INFECTIOUS ORGANISM: ICD-10-CM

## 2020-02-28 DIAGNOSIS — Z78.9 FAILURE OF OUTPATIENT TREATMENT: ICD-10-CM

## 2020-02-28 PROBLEM — D72.829 LEUKOCYTOSIS: Status: ACTIVE | Noted: 2020-02-28

## 2020-02-28 PROBLEM — E87.20 LACTIC ACIDOSIS: Status: ACTIVE | Noted: 2020-02-28

## 2020-02-28 PROBLEM — R73.9 HYPERGLYCEMIA: Status: ACTIVE | Noted: 2020-02-28

## 2020-02-28 PROBLEM — N40.0 BPH (BENIGN PROSTATIC HYPERPLASIA): Status: ACTIVE | Noted: 2020-02-28

## 2020-02-28 PROBLEM — E66.01 CLASS 3 SEVERE OBESITY IN ADULT (HCC): Status: ACTIVE | Noted: 2020-02-28

## 2020-02-28 LAB
ALBUMIN SERPL BCP-MCNC: 4.1 G/DL (ref 3.2–4.9)
ALBUMIN SERPL BCP-MCNC: 4.3 G/DL (ref 3.2–4.9)
ALBUMIN/GLOB SERPL: 1.4 G/DL
ALBUMIN/GLOB SERPL: 1.5 G/DL
ALP SERPL-CCNC: 67 U/L (ref 30–99)
ALP SERPL-CCNC: 72 U/L (ref 30–99)
ALT SERPL-CCNC: 28 U/L (ref 2–50)
ALT SERPL-CCNC: 29 U/L (ref 2–50)
ANION GAP SERPL CALC-SCNC: 10 MMOL/L (ref 0–11.9)
ANION GAP SERPL CALC-SCNC: 12 MMOL/L (ref 0–11.9)
AST SERPL-CCNC: 16 U/L (ref 12–45)
AST SERPL-CCNC: 19 U/L (ref 12–45)
BASOPHILS # BLD AUTO: 0.5 % (ref 0–1.8)
BASOPHILS # BLD AUTO: 0.5 % (ref 0–1.8)
BASOPHILS # BLD: 0.09 K/UL (ref 0–0.12)
BASOPHILS # BLD: 0.09 K/UL (ref 0–0.12)
BILIRUB SERPL-MCNC: 0.7 MG/DL (ref 0.1–1.5)
BILIRUB SERPL-MCNC: 0.8 MG/DL (ref 0.1–1.5)
BUN SERPL-MCNC: 18 MG/DL (ref 8–22)
BUN SERPL-MCNC: 19 MG/DL (ref 8–22)
CALCIUM SERPL-MCNC: 9 MG/DL (ref 8.5–10.5)
CALCIUM SERPL-MCNC: 9.1 MG/DL (ref 8.5–10.5)
CHLORIDE SERPL-SCNC: 103 MMOL/L (ref 96–112)
CHLORIDE SERPL-SCNC: 104 MMOL/L (ref 96–112)
CO2 SERPL-SCNC: 24 MMOL/L (ref 20–33)
CO2 SERPL-SCNC: 25 MMOL/L (ref 20–33)
CORTIS SERPL-MCNC: 2 UG/DL (ref 0–23)
CREAT SERPL-MCNC: 1.09 MG/DL (ref 0.5–1.4)
CREAT SERPL-MCNC: 1.13 MG/DL (ref 0.5–1.4)
EKG IMPRESSION: NORMAL
EOSINOPHIL # BLD AUTO: 0.04 K/UL (ref 0–0.51)
EOSINOPHIL # BLD AUTO: 0.05 K/UL (ref 0–0.51)
EOSINOPHIL NFR BLD: 0.2 % (ref 0–6.9)
EOSINOPHIL NFR BLD: 0.3 % (ref 0–6.9)
ERYTHROCYTE [DISTWIDTH] IN BLOOD BY AUTOMATED COUNT: 37.5 FL (ref 35.9–50)
ERYTHROCYTE [DISTWIDTH] IN BLOOD BY AUTOMATED COUNT: 38.3 FL (ref 35.9–50)
EST. AVERAGE GLUCOSE BLD GHB EST-MCNC: 200 MG/DL
EST. AVERAGE GLUCOSE BLD GHB EST-MCNC: 200 MG/DL
FLUAV RNA SPEC QL NAA+PROBE: NEGATIVE
FLUBV RNA SPEC QL NAA+PROBE: NEGATIVE
GLOBULIN SER CALC-MCNC: 2.7 G/DL (ref 1.9–3.5)
GLOBULIN SER CALC-MCNC: 3 G/DL (ref 1.9–3.5)
GLUCOSE BLD-MCNC: 156 MG/DL (ref 65–99)
GLUCOSE BLD-MCNC: 280 MG/DL (ref 65–99)
GLUCOSE SERPL-MCNC: 153 MG/DL (ref 65–99)
GLUCOSE SERPL-MCNC: 226 MG/DL (ref 65–99)
HBA1C MFR BLD: 8.6 % (ref 0–5.6)
HBA1C MFR BLD: 8.6 % (ref 0–5.6)
HCT VFR BLD AUTO: 44.6 % (ref 42–52)
HCT VFR BLD AUTO: 45.2 % (ref 42–52)
HGB BLD-MCNC: 15.2 G/DL (ref 14–18)
HGB BLD-MCNC: 16 G/DL (ref 14–18)
IMM GRANULOCYTES # BLD AUTO: 0.27 K/UL (ref 0–0.11)
IMM GRANULOCYTES # BLD AUTO: 0.29 K/UL (ref 0–0.11)
IMM GRANULOCYTES NFR BLD AUTO: 1.6 % (ref 0–0.9)
IMM GRANULOCYTES NFR BLD AUTO: 1.6 % (ref 0–0.9)
INR PPP: 0.98 (ref 0.87–1.13)
LACTATE BLD-SCNC: 2.7 MMOL/L (ref 0.5–2)
LACTATE BLD-SCNC: 3.2 MMOL/L (ref 0.5–2)
LACTATE BLD-SCNC: 3.6 MMOL/L (ref 0.5–2)
LACTATE BLD-SCNC: 3.8 MMOL/L (ref 0.5–2)
LYMPHOCYTES # BLD AUTO: 3.72 K/UL (ref 1–4.8)
LYMPHOCYTES # BLD AUTO: 4.12 K/UL (ref 1–4.8)
LYMPHOCYTES NFR BLD: 20.4 % (ref 22–41)
LYMPHOCYTES NFR BLD: 24.2 % (ref 22–41)
MCH RBC QN AUTO: 27.6 PG (ref 27–33)
MCH RBC QN AUTO: 28.7 PG (ref 27–33)
MCHC RBC AUTO-ENTMCNC: 34.1 G/DL (ref 33.7–35.3)
MCHC RBC AUTO-ENTMCNC: 35.4 G/DL (ref 33.7–35.3)
MCV RBC AUTO: 81 FL (ref 81.4–97.8)
MCV RBC AUTO: 81.1 FL (ref 81.4–97.8)
MONOCYTES # BLD AUTO: 0.83 K/UL (ref 0–0.85)
MONOCYTES # BLD AUTO: 0.95 K/UL (ref 0–0.85)
MONOCYTES NFR BLD AUTO: 4.9 % (ref 0–13.4)
MONOCYTES NFR BLD AUTO: 5.2 % (ref 0–13.4)
NEUTROPHILS # BLD AUTO: 11.68 K/UL (ref 1.82–7.42)
NEUTROPHILS # BLD AUTO: 13.11 K/UL (ref 1.82–7.42)
NEUTROPHILS NFR BLD: 68.5 % (ref 44–72)
NEUTROPHILS NFR BLD: 72.1 % (ref 44–72)
NRBC # BLD AUTO: 0 K/UL
NRBC # BLD AUTO: 0 K/UL
NRBC BLD-RTO: 0 /100 WBC
NRBC BLD-RTO: 0 /100 WBC
NT-PROBNP SERPL IA-MCNC: 143 PG/ML (ref 0–125)
PLATELET # BLD AUTO: 239 K/UL (ref 164–446)
PLATELET # BLD AUTO: 252 K/UL (ref 164–446)
PMV BLD AUTO: 10 FL (ref 9–12.9)
PMV BLD AUTO: 9.8 FL (ref 9–12.9)
POTASSIUM SERPL-SCNC: 3.6 MMOL/L (ref 3.6–5.5)
POTASSIUM SERPL-SCNC: 3.9 MMOL/L (ref 3.6–5.5)
PROCALCITONIN SERPL-MCNC: 0.17 NG/ML
PROT SERPL-MCNC: 6.8 G/DL (ref 6–8.2)
PROT SERPL-MCNC: 7.3 G/DL (ref 6–8.2)
PROTHROMBIN TIME: 13.1 SEC (ref 12–14.6)
RBC # BLD AUTO: 5.5 M/UL (ref 4.7–6.1)
RBC # BLD AUTO: 5.58 M/UL (ref 4.7–6.1)
SODIUM SERPL-SCNC: 139 MMOL/L (ref 135–145)
SODIUM SERPL-SCNC: 139 MMOL/L (ref 135–145)
T4 FREE SERPL-MCNC: 0.79 NG/DL (ref 0.53–1.43)
TROPONIN T SERPL-MCNC: <6 NG/L (ref 6–19)
TROPONIN T SERPL-MCNC: <6 NG/L (ref 6–19)
TSH SERPL DL<=0.005 MIU/L-ACNC: 1.77 UIU/ML (ref 0.38–5.33)
WBC # BLD AUTO: 17 K/UL (ref 4.8–10.8)
WBC # BLD AUTO: 18.2 K/UL (ref 4.8–10.8)

## 2020-02-28 PROCEDURE — 85025 COMPLETE CBC W/AUTO DIFF WBC: CPT | Mod: 91

## 2020-02-28 PROCEDURE — 87040 BLOOD CULTURE FOR BACTERIA: CPT

## 2020-02-28 PROCEDURE — 84484 ASSAY OF TROPONIN QUANT: CPT

## 2020-02-28 PROCEDURE — 700105 HCHG RX REV CODE 258: Performed by: EMERGENCY MEDICINE

## 2020-02-28 PROCEDURE — 99223 1ST HOSP IP/OBS HIGH 75: CPT | Performed by: HOSPITALIST

## 2020-02-28 PROCEDURE — 96367 TX/PROPH/DG ADDL SEQ IV INF: CPT

## 2020-02-28 PROCEDURE — 36415 COLL VENOUS BLD VENIPUNCTURE: CPT

## 2020-02-28 PROCEDURE — A9270 NON-COVERED ITEM OR SERVICE: HCPCS | Performed by: HOSPITALIST

## 2020-02-28 PROCEDURE — 83735 ASSAY OF MAGNESIUM: CPT

## 2020-02-28 PROCEDURE — 84443 ASSAY THYROID STIM HORMONE: CPT

## 2020-02-28 PROCEDURE — 96365 THER/PROPH/DIAG IV INF INIT: CPT

## 2020-02-28 PROCEDURE — 85610 PROTHROMBIN TIME: CPT

## 2020-02-28 PROCEDURE — 94640 AIRWAY INHALATION TREATMENT: CPT

## 2020-02-28 PROCEDURE — 83605 ASSAY OF LACTIC ACID: CPT

## 2020-02-28 PROCEDURE — 80053 COMPREHEN METABOLIC PANEL: CPT | Mod: 91

## 2020-02-28 PROCEDURE — 83036 HEMOGLOBIN GLYCOSYLATED A1C: CPT

## 2020-02-28 PROCEDURE — 99285 EMERGENCY DEPT VISIT HI MDM: CPT

## 2020-02-28 PROCEDURE — 82533 TOTAL CORTISOL: CPT

## 2020-02-28 PROCEDURE — 700101 HCHG RX REV CODE 250: Performed by: EMERGENCY MEDICINE

## 2020-02-28 PROCEDURE — 87502 INFLUENZA DNA AMP PROBE: CPT

## 2020-02-28 PROCEDURE — 84145 PROCALCITONIN (PCT): CPT

## 2020-02-28 PROCEDURE — 83605 ASSAY OF LACTIC ACID: CPT | Mod: 91

## 2020-02-28 PROCEDURE — 94640 AIRWAY INHALATION TREATMENT: CPT | Performed by: PHYSICIAN ASSISTANT

## 2020-02-28 PROCEDURE — 83880 ASSAY OF NATRIURETIC PEPTIDE: CPT

## 2020-02-28 PROCEDURE — 80053 COMPREHEN METABOLIC PANEL: CPT

## 2020-02-28 PROCEDURE — 700105 HCHG RX REV CODE 258: Performed by: HOSPITALIST

## 2020-02-28 PROCEDURE — 83036 HEMOGLOBIN GLYCOSYLATED A1C: CPT | Mod: 91

## 2020-02-28 PROCEDURE — 84439 ASSAY OF FREE THYROXINE: CPT

## 2020-02-28 PROCEDURE — 85025 COMPLETE CBC W/AUTO DIFF WBC: CPT

## 2020-02-28 PROCEDURE — 770020 HCHG ROOM/CARE - TELE (206)

## 2020-02-28 PROCEDURE — 700102 HCHG RX REV CODE 250 W/ 637 OVERRIDE(OP): Performed by: HOSPITALIST

## 2020-02-28 PROCEDURE — 82962 GLUCOSE BLOOD TEST: CPT

## 2020-02-28 PROCEDURE — 700111 HCHG RX REV CODE 636 W/ 250 OVERRIDE (IP): Performed by: EMERGENCY MEDICINE

## 2020-02-28 PROCEDURE — 94760 N-INVAS EAR/PLS OXIMETRY 1: CPT | Performed by: PHYSICIAN ASSISTANT

## 2020-02-28 PROCEDURE — 96375 TX/PRO/DX INJ NEW DRUG ADDON: CPT

## 2020-02-28 PROCEDURE — 99215 OFFICE O/P EST HI 40 MIN: CPT | Mod: 25 | Performed by: PHYSICIAN ASSISTANT

## 2020-02-28 PROCEDURE — 93005 ELECTROCARDIOGRAM TRACING: CPT | Performed by: EMERGENCY MEDICINE

## 2020-02-28 PROCEDURE — 71045 X-RAY EXAM CHEST 1 VIEW: CPT

## 2020-02-28 RX ORDER — SODIUM CHLORIDE, SODIUM LACTATE, POTASSIUM CHLORIDE, CALCIUM CHLORIDE 600; 310; 30; 20 MG/100ML; MG/100ML; MG/100ML; MG/100ML
INJECTION, SOLUTION INTRAVENOUS CONTINUOUS
Status: DISCONTINUED | OUTPATIENT
Start: 2020-02-28 | End: 2020-02-29

## 2020-02-28 RX ORDER — GUAIFENESIN/DEXTROMETHORPHAN 100-10MG/5
10 SYRUP ORAL EVERY 6 HOURS PRN
Status: DISCONTINUED | OUTPATIENT
Start: 2020-02-28 | End: 2020-03-02 | Stop reason: HOSPADM

## 2020-02-28 RX ORDER — SODIUM CHLORIDE, SODIUM LACTATE, POTASSIUM CHLORIDE, AND CALCIUM CHLORIDE .6; .31; .03; .02 G/100ML; G/100ML; G/100ML; G/100ML
30 INJECTION, SOLUTION INTRAVENOUS
Status: COMPLETED | OUTPATIENT
Start: 2020-02-28 | End: 2020-02-28

## 2020-02-28 RX ORDER — SODIUM CHLORIDE, SODIUM LACTATE, POTASSIUM CHLORIDE, AND CALCIUM CHLORIDE .6; .31; .03; .02 G/100ML; G/100ML; G/100ML; G/100ML
500 INJECTION, SOLUTION INTRAVENOUS
Status: DISCONTINUED | OUTPATIENT
Start: 2020-02-28 | End: 2020-03-02 | Stop reason: HOSPADM

## 2020-02-28 RX ORDER — KETOROLAC TROMETHAMINE 30 MG/ML
30 INJECTION, SOLUTION INTRAMUSCULAR; INTRAVENOUS ONCE
Status: COMPLETED | OUTPATIENT
Start: 2020-02-28 | End: 2020-02-28

## 2020-02-28 RX ORDER — ACETAMINOPHEN 325 MG/1
650 TABLET ORAL EVERY 6 HOURS PRN
Status: DISCONTINUED | OUTPATIENT
Start: 2020-02-28 | End: 2020-03-02 | Stop reason: HOSPADM

## 2020-02-28 RX ORDER — DOXYCYCLINE HYCLATE 100 MG
100 TABLET ORAL 2 TIMES DAILY
Status: ON HOLD | COMMUNITY
Start: 2020-02-25 | End: 2020-03-02

## 2020-02-28 RX ORDER — IPRATROPIUM BROMIDE AND ALBUTEROL SULFATE 2.5; .5 MG/3ML; MG/3ML
3 SOLUTION RESPIRATORY (INHALATION) ONCE
Status: COMPLETED | OUTPATIENT
Start: 2020-02-28 | End: 2020-02-28

## 2020-02-28 RX ORDER — LABETALOL HYDROCHLORIDE 5 MG/ML
10 INJECTION, SOLUTION INTRAVENOUS EVERY 4 HOURS PRN
Status: DISCONTINUED | OUTPATIENT
Start: 2020-02-28 | End: 2020-03-02 | Stop reason: HOSPADM

## 2020-02-28 RX ORDER — AZITHROMYCIN 250 MG/1
500 TABLET, FILM COATED ORAL DAILY
Status: COMPLETED | OUTPATIENT
Start: 2020-02-28 | End: 2020-03-01

## 2020-02-28 RX ORDER — PROMETHAZINE HYDROCHLORIDE 25 MG/1
12.5-25 SUPPOSITORY RECTAL EVERY 4 HOURS PRN
Status: DISCONTINUED | OUTPATIENT
Start: 2020-02-28 | End: 2020-03-02 | Stop reason: HOSPADM

## 2020-02-28 RX ORDER — AMOXICILLIN 250 MG
2 CAPSULE ORAL 2 TIMES DAILY
Status: DISCONTINUED | OUTPATIENT
Start: 2020-02-28 | End: 2020-03-02 | Stop reason: HOSPADM

## 2020-02-28 RX ORDER — BISACODYL 10 MG
10 SUPPOSITORY, RECTAL RECTAL
Status: DISCONTINUED | OUTPATIENT
Start: 2020-02-28 | End: 2020-03-02 | Stop reason: HOSPADM

## 2020-02-28 RX ORDER — PROMETHAZINE HYDROCHLORIDE 25 MG/1
12.5-25 TABLET ORAL EVERY 4 HOURS PRN
Status: DISCONTINUED | OUTPATIENT
Start: 2020-02-28 | End: 2020-03-02 | Stop reason: HOSPADM

## 2020-02-28 RX ORDER — ONDANSETRON 4 MG/1
4 TABLET, ORALLY DISINTEGRATING ORAL EVERY 4 HOURS PRN
Status: DISCONTINUED | OUTPATIENT
Start: 2020-02-28 | End: 2020-03-02 | Stop reason: HOSPADM

## 2020-02-28 RX ORDER — PROCHLORPERAZINE EDISYLATE 5 MG/ML
5-10 INJECTION INTRAMUSCULAR; INTRAVENOUS EVERY 4 HOURS PRN
Status: DISCONTINUED | OUTPATIENT
Start: 2020-02-28 | End: 2020-03-02 | Stop reason: HOSPADM

## 2020-02-28 RX ORDER — AMOXICILLIN AND CLAVULANATE POTASSIUM 875; 125 MG/1; MG/1
1 TABLET, FILM COATED ORAL 2 TIMES DAILY
Status: ON HOLD | COMMUNITY
Start: 2020-02-25 | End: 2020-03-02

## 2020-02-28 RX ORDER — TAMSULOSIN HYDROCHLORIDE 0.4 MG/1
0.4 CAPSULE ORAL
Status: DISCONTINUED | OUTPATIENT
Start: 2020-02-29 | End: 2020-02-29

## 2020-02-28 RX ORDER — POLYETHYLENE GLYCOL 3350 17 G/17G
1 POWDER, FOR SOLUTION ORAL
Status: DISCONTINUED | OUTPATIENT
Start: 2020-02-28 | End: 2020-03-02 | Stop reason: HOSPADM

## 2020-02-28 RX ORDER — ONDANSETRON 2 MG/ML
4 INJECTION INTRAMUSCULAR; INTRAVENOUS EVERY 4 HOURS PRN
Status: DISCONTINUED | OUTPATIENT
Start: 2020-02-28 | End: 2020-03-02 | Stop reason: HOSPADM

## 2020-02-28 RX ORDER — PREDNISONE 20 MG/1
60 TABLET ORAL DAILY
Status: DISCONTINUED | OUTPATIENT
Start: 2020-02-28 | End: 2020-02-29

## 2020-02-28 RX ADMIN — PREDNISONE 60 MG: 20 TABLET ORAL at 16:05

## 2020-02-28 RX ADMIN — IPRATROPIUM BROMIDE AND ALBUTEROL SULFATE 3 ML: 2.5; .5 SOLUTION RESPIRATORY (INHALATION) at 10:06

## 2020-02-28 RX ADMIN — DOXYCYCLINE 100 MG: 100 INJECTION, POWDER, LYOPHILIZED, FOR SOLUTION INTRAVENOUS at 16:03

## 2020-02-28 RX ADMIN — GUAIFENESIN AND DEXTROMETHORPHAN 10 ML: 100; 10 SYRUP ORAL at 18:27

## 2020-02-28 RX ADMIN — AZITHROMYCIN MONOHYDRATE 500 MG: 250 TABLET ORAL at 18:07

## 2020-02-28 RX ADMIN — CEFTRIAXONE SODIUM 2 G: 2 INJECTION, POWDER, FOR SOLUTION INTRAMUSCULAR; INTRAVENOUS at 15:14

## 2020-02-28 RX ADMIN — SODIUM CHLORIDE, POTASSIUM CHLORIDE, SODIUM LACTATE AND CALCIUM CHLORIDE 3522 ML: 600; 310; 30; 20 INJECTION, SOLUTION INTRAVENOUS at 15:35

## 2020-02-28 RX ADMIN — INSULIN HUMAN 9 UNITS: 100 INJECTION, SOLUTION PARENTERAL at 21:00

## 2020-02-28 RX ADMIN — ALBUTEROL SULFATE 2.5 MG: 2.5 SOLUTION RESPIRATORY (INHALATION) at 15:39

## 2020-02-28 RX ADMIN — KETOROLAC TROMETHAMINE 30 MG: 30 INJECTION, SOLUTION INTRAMUSCULAR at 15:14

## 2020-02-28 RX ADMIN — SODIUM CHLORIDE, POTASSIUM CHLORIDE, SODIUM LACTATE AND CALCIUM CHLORIDE 3522 ML: 600; 310; 30; 20 INJECTION, SOLUTION INTRAVENOUS at 18:25

## 2020-02-28 ASSESSMENT — PATIENT HEALTH QUESTIONNAIRE - PHQ9
2. FEELING DOWN, DEPRESSED, IRRITABLE, OR HOPELESS: NOT AT ALL
SUM OF ALL RESPONSES TO PHQ9 QUESTIONS 1 AND 2: 0
1. LITTLE INTEREST OR PLEASURE IN DOING THINGS: NOT AT ALL

## 2020-02-28 ASSESSMENT — ENCOUNTER SYMPTOMS
NECK PAIN: 0
CONSTIPATION: 0
VOMITING: 0
HEMOPTYSIS: 0
BRUISES/BLEEDS EASILY: 0
INSOMNIA: 1
SPUTUM PRODUCTION: 1
SHORTNESS OF BREATH: 1
HOARSE VOICE: 1
EYE REDNESS: 0
COUGH: 1
DYSPNEA ON EXERTION: 1
NAUSEA: 0
DOUBLE VISION: 0
FEVER: 0
VOMITING: 0
HEADACHES: 0
SEIZURES: 0
DIZZINESS: 0
CHILLS: 0
BLOOD IN STOOL: 0
LOSS OF CONSCIOUSNESS: 0
FEVER: 0
COUGH: 1
SPUTUM PRODUCTION: 1
EYE DISCHARGE: 0
DIFFICULTY BREATHING: 1
RHINORRHEA: 1
ABDOMINAL PAIN: 0
NEUROLOGICAL NEGATIVE: 1
HEMOPTYSIS: 0
DIARRHEA: 0
DIARRHEA: 0
EYES NEGATIVE: 1
PALPITATIONS: 0
MYALGIAS: 1
SHORTNESS OF BREATH: 1
CHILLS: 1
DIZZINESS: 0
BACK PAIN: 1
CHEST TIGHTNESS: 1
ORTHOPNEA: 0
NERVOUS/ANXIOUS: 0
WHEEZING: 0
WHEEZING: 1
GASTROINTESTINAL NEGATIVE: 1
FOCAL WEAKNESS: 0
HEARTBURN: 0
DIAPHORESIS: 0
SORE THROAT: 1
HEADACHES: 0

## 2020-02-28 ASSESSMENT — LIFESTYLE VARIABLES
AVERAGE NUMBER OF DAYS PER WEEK YOU HAVE A DRINK CONTAINING ALCOHOL: 0
HAVE YOU EVER FELT YOU SHOULD CUT DOWN ON YOUR DRINKING: NO
HAVE YOU EVER FELT YOU SHOULD CUT DOWN ON YOUR DRINKING: NO
EVER FELT BAD OR GUILTY ABOUT YOUR DRINKING: NO
ALCOHOL_USE: NO
EVER FELT BAD OR GUILTY ABOUT YOUR DRINKING: NO
HAVE PEOPLE ANNOYED YOU BY CRITICIZING YOUR DRINKING: NO
DOES PATIENT WANT TO STOP DRINKING: NO
TOTAL SCORE: 0
TOTAL SCORE: 0
DO YOU DRINK ALCOHOL: NO
ON A TYPICAL DAY WHEN YOU DRINK ALCOHOL HOW MANY DRINKS DO YOU HAVE: 0
EVER HAD A DRINK FIRST THING IN THE MORNING TO STEADY YOUR NERVES TO GET RID OF A HANGOVER: NO
CONSUMPTION TOTAL: NEGATIVE
EVER HAD A DRINK FIRST THING IN THE MORNING TO STEADY YOUR NERVES TO GET RID OF A HANGOVER: NO
TOTAL SCORE: 0
CONSUMPTION TOTAL: INCOMPLETE
TOTAL SCORE: 0
EVER_SMOKED: NEVER
TOTAL SCORE: 0
SUBSTANCE_ABUSE: 1
HAVE PEOPLE ANNOYED YOU BY CRITICIZING YOUR DRINKING: NO
TOTAL SCORE: 0
HOW MANY TIMES IN THE PAST YEAR HAVE YOU HAD 5 OR MORE DRINKS IN A DAY: 0

## 2020-02-28 ASSESSMENT — COGNITIVE AND FUNCTIONAL STATUS - GENERAL
DAILY ACTIVITIY SCORE: 24
SUGGESTED CMS G CODE MODIFIER MOBILITY: CH
MOBILITY SCORE: 24
SUGGESTED CMS G CODE MODIFIER DAILY ACTIVITY: CH

## 2020-02-28 ASSESSMENT — FIBROSIS 4 INDEX
FIB4 SCORE: 0.67
FIB4 SCORE: 0.58

## 2020-02-28 NOTE — PROGRESS NOTES
Subjective:      Ji Montoya is a 45 y.o. male who presents with Pneumonia (came in  on2/25/20, still has cough, x1 month)          Patient is a 45-year-old male who returns today with continued cough.  Patient recently had evaluation on 2-25 of which he was rediagnosed with pneumonia at the right lung.  Patient was previously treated on 2-2 for pneumonia as well.  He was restarted on doxycycline, Augmentin, started on Medrol pack, cough syrup and Tessalon Perles.  He is been utilizing his rescue inhaler a few times a day.  He does report history of asthma and long history of pneumonia 1-2 times a year.  He also reports history of sepsis of which his last episode was 2 years ago he believes.  He reports in general his congestion is improved however he has not had notable improvement with the Medrol typically he does in the past.    Pneumonia   He complains of chest tightness, cough, difficulty breathing, hoarse voice, shortness of breath, sputum production and wheezing. There is no hemoptysis. This is a new problem. Episode onset: Return of symptoms approximately 6 to 7 days ago. The problem occurs daily. The problem has been waxing and waning. The cough is productive of sputum. Associated symptoms include dyspnea on exertion, malaise/fatigue, myalgias, nasal congestion, rhinorrhea and a sore throat. Pertinent negatives include no chest pain, ear pain, fever, headaches or orthopnea. His symptoms are aggravated by any activity. His symptoms are alleviated by nothing. His symptoms are not alleviated by OTC cough suppressant, oral steroids, beta-agonist and prescription cough suppressant. His past medical history is significant for asthma and pneumonia.       Review of Systems   Constitutional: Positive for chills and malaise/fatigue. Negative for fever.   HENT: Positive for congestion, hoarse voice, rhinorrhea and sore throat. Negative for ear discharge and ear pain.    Eyes: Negative for discharge and redness.  "  Respiratory: Positive for cough, sputum production, shortness of breath and wheezing. Negative for hemoptysis.    Cardiovascular: Positive for dyspnea on exertion. Negative for chest pain and leg swelling.   Gastrointestinal: Negative for diarrhea and vomiting.   Genitourinary: Negative for dysuria.   Musculoskeletal: Positive for myalgias. Negative for neck pain.   Skin: Negative for itching and rash.   Neurological: Negative for dizziness and headaches.          Objective:     /86   Pulse 94   Temp 36.2 °C (97.2 °F) (Temporal)   Resp (!) 22   Ht 1.651 m (5' 5\")   Wt 120.2 kg (265 lb)   SpO2 92%   BMI 44.10 kg/m²    PMH:  has no past medical history on file.  MEDS: Reviewed .   ALLERGIES: No Known Allergies  SURGHX: No past surgical history on file.  SOCHX:  reports that he has quit smoking. His smoking use included cigarettes. He smoked 0.00 packs per day. He quit smokeless tobacco use about 4 years ago. He reports previous alcohol use.  FH: Family history was reviewed, no pertinent findings to report    Physical Exam  Vitals signs reviewed.   Constitutional:       Appearance: Normal appearance. He is well-developed.   HENT:      Head: Normocephalic and atraumatic.      Ears:      Comments: Bilateral clear middle ear effusions.     Nose:      Comments: Rhinorrhea noted.     Mouth/Throat:      Comments: Posterior oropharynx is erythematous, positive postnasal drainage.  No evidence of exudate.  Eyes:      Conjunctiva/sclera: Conjunctivae normal.      Pupils: Pupils are equal, round, and reactive to light.   Neck:      Musculoskeletal: Normal range of motion and neck supple.   Cardiovascular:      Rate and Rhythm: Normal rate and regular rhythm.      Heart sounds: No murmur.   Pulmonary:      Effort: Pulmonary effort is normal. No respiratory distress.      Breath sounds: Wheezing and rhonchi present.      Comments: Coughing throughout duration of the visit.   Musculoskeletal: Normal range of motion. "      Right lower leg: No edema.      Left lower leg: No edema.   Lymphadenopathy:      Cervical: No cervical adenopathy.   Skin:     General: Skin is warm.      Findings: No rash.   Neurological:      Mental Status: He is alert and oriented to person, place, and time.   Psychiatric:         Mood and Affect: Mood normal.         Behavior: Behavior normal.         Thought Content: Thought content normal.                 Assessment/Plan:       1. Community acquired pneumonia, unspecified laterality  - ipratropium-albuterol (DUONEB) nebulizer solution    2. Mild asthma with acute exacerbation, unspecified whether persistent  - ipratropium-albuterol (DUONEB) nebulizer solution    3. History of sepsis  4. Type 2 diabetes mellitus with other specified complication, without long-term current use of insulin (HCC)  5. Failure of outpatient treatment    Weight rechecked 260.   Recheck POX after breathing tx 97% on RA.   Will pursue blood work at this time.     Slight improvement after breathing treatment today with improved air movement throughout.  Called and spoke with patient regarding the above labs with noted leukocytosis with a shift and elevated lactic acid some labs still pending at this time.    Based on patient's original vitals with respirations of 22, pulse ox of 92, 2 trials of antibiotics combined with recent leukocytosis along with elevated lactic acid I do feel that patient needs further evaluation in the emergency room at this time.  Patient is agreeable to have such at this time however patient would like to discuss with family members if you like to pursue evaluation at [facility] or the emergency room in [facility].  Encourage patient to stay with and [facility] as we can compare previous images and labs however this is entirely up to the patient.

## 2020-02-28 NOTE — ED NOTES
Pharmacy Medication Reconciliation    Med rec updated and complete per pt at bedside  Ok per Pt to discuss medications with visitor/s present  Allergies have been verified   Pt home pharmacy:[facility]      Pt reports that he is on a 5 day course of AUGMENTIN/MONODOX that was started on 02/22/2020    Pt reports that he started a MEDROL DOSEPAK on 02/22/2020

## 2020-02-28 NOTE — ED PROVIDER NOTES
ED Provider Note    CHIEF COMPLAINT  Chief Complaint   Patient presents with   • Shortness of Breath   • Cough       HPI  Ji Montoya is a 45 y.o. male who presents with 1 month of intermittently productive cough and increasing shortness of breath.  He was diagnosed with pneumonia and put on antibiotics 4 weeks ago, but he does not think he got much better.  He has been on steroids and an inhaler without relief.  5 days ago, the cough and shortness of breath seem to be getting worse, so he went to urgent care 2 days ago.  They did a chest x-ray which showed worsening pneumonia and did labs yesterday which showed an increased lactate and white blood cell count, so he was instructed to come here today.  He has not been a smoker for years.  He is a diabetic but is diet controlled.  He has been septic before from pneumonia, and he usually gets pneumonia 1-2 times per year.  He just recently moved here.    REVIEW OF SYSTEMS  See HPI for further details.  No fevers, vomiting.  All other systems are negative.    PAST MEDICAL HISTORY  No past medical history on file.    FAMILY HISTORY  History reviewed. No pertinent family history.    SOCIAL HISTORY  Social History     Socioeconomic History   • Marital status:      Spouse name: Not on file   • Number of children: Not on file   • Years of education: Not on file   • Highest education level: Not on file   Occupational History   • Not on file   Social Needs   • Financial resource strain: Not on file   • Food insecurity     Worry: Not on file     Inability: Not on file   • Transportation needs     Medical: Not on file     Non-medical: Not on file   Tobacco Use   • Smoking status: Former Smoker     Packs/day: 0.00     Types: Cigarettes   • Smokeless tobacco: Former User     Quit date: 2016   • Tobacco comment: quit 8 years ago    Substance and Sexual Activity   • Alcohol use: Not Currently   • Drug use: Not Currently   • Sexual activity: Not on file   Lifestyle  "  • Physical activity     Days per week: Not on file     Minutes per session: Not on file   • Stress: Not on file   Relationships   • Social connections     Talks on phone: Not on file     Gets together: Not on file     Attends Jewish service: Not on file     Active member of club or organization: Not on file     Attends meetings of clubs or organizations: Not on file     Relationship status: Not on file   • Intimate partner violence     Fear of current or ex partner: Not on file     Emotionally abused: Not on file     Physically abused: Not on file     Forced sexual activity: Not on file   Other Topics Concern   • Not on file   Social History Narrative   • Not on file       SURGICAL HISTORY  No past surgical history on file.    CURRENT MEDICATIONS  Home Medications     Reviewed by Maritza Rolon (Pharmacy Tech) on 02/28/20 at 1508  Med List Status: Complete   Medication Last Dose Status   amoxicillin-clavulanate (AUGMENTIN) 875-125 MG Tab 2/27/2020 Active   benzonatate (TESSALON) 100 MG Cap 2/28/2020 Active   doxycycline (VIBRAMYCIN) 100 MG Tab 2/27/2020 Active   methylPREDNISolone (MEDROL DOSEPAK) 4 MG Tablet Therapy Pack 2/27/2020 Active                ALLERGIES  No Known Allergies    PHYSICAL EXAM  VITAL SIGNS: /59   Pulse 87   Temp 36.2 °C (97.2 °F) (Temporal)   Resp 16   Ht 1.651 m (5' 5\")   Wt 117.4 kg (258 lb 13.1 oz)   SpO2 94%   BMI 43.07 kg/m²  @[facility][[id number]::@   Constitutional: Well developed, obese, mild acute respiratory distress, Non-toxic appearance.   HENT: Normocephalic, Atraumatic, Bilateral external ears normal, Oropharynx clear, mucous membranes are dry.  Eyes: PERRLA, EOMI, Conjunctiva normal, No discharge. No icterus.  Neck: Normal range of motion. Supple, No stridor.   Lymphatic: No cervical lymphadenopathy noted.   Cardiovascular: Normal heart rate, Normal rhythm, No murmurs, No rubs, No gallops.   Thorax & Lungs: Decreased breath sounds throughout. wheezes, rales, rhonchi " are absent  Abdomen: Bowel sounds normal, Soft, No tenderness, No masses, no rebound or guarding   Skin: Warm, Dry, No erythema, No rash.  Flushing to the face with harsh dry coughing  Extremities: Intact distal pulses, No edema, No tenderness  Musculoskeletal: Good range of motion in all major joints. No tenderness to palpation or major deformities noted. Normal gait.  Neurologic: Alert & oriented x 3, cranial nerve and cerebellar exams grossly normal  Psychiatric: Affect mildly anxious, Judgment normal, Mood normal.     EKG  Twelve-lead EKG by my interpretation is as below.  No ST or T wave changes to indicate ischemia or infarct    RADIOLOGY/PROCEDURES  Radiology read a two-view chest x-ray from 2/25/2020 as having lingular pneumonia or atelectasis    COURSE & MEDICAL DECISION MAKING  Pertinent Labs & Imaging studies reviewed. (See chart for details)  I reviewed the labs from 2/25/2020 which showed a white blood cell count over 17 and a lactate over 2.5.  Patient is given IV fluids here because he achieved septic protocol.  He is also given albuterol nebulizer treatment, Toradol for comfort, Rocephin and doxycycline.  Results for orders placed or performed during the hospital encounter of 02/28/20   CBC WITH DIFFERENTIAL   Result Value Ref Range    WBC 17.0 (H) 4.8 - 10.8 K/uL    RBC 5.50 4.70 - 6.10 M/uL    Hemoglobin 15.2 14.0 - 18.0 g/dL    Hematocrit 44.6 42.0 - 52.0 %    MCV 81.1 (L) 81.4 - 97.8 fL    MCH 27.6 27.0 - 33.0 pg    MCHC 34.1 33.7 - 35.3 g/dL    RDW 38.3 35.9 - 50.0 fL    Platelet Count 252 164 - 446 K/uL    MPV 10.0 9.0 - 12.9 fL    Neutrophils-Polys 68.50 44.00 - 72.00 %    Lymphocytes 24.20 22.00 - 41.00 %    Monocytes 4.90 0.00 - 13.40 %    Eosinophils 0.30 0.00 - 6.90 %    Basophils 0.50 0.00 - 1.80 %    Immature Granulocytes 1.60 (H) 0.00 - 0.90 %    Nucleated RBC 0.00 /100 WBC    Neutrophils (Absolute) 11.68 (H) 1.82 - 7.42 K/uL    Lymphs (Absolute) 4.12 1.00 - 4.80 K/uL    Monos  (Absolute) 0.83 0.00 - 0.85 K/uL    Eos (Absolute) 0.05 0.00 - 0.51 K/uL    Baso (Absolute) 0.09 0.00 - 0.12 K/uL    Immature Granulocytes (abs) 0.27 (H) 0.00 - 0.11 K/uL    NRBC (Absolute) 0.00 K/uL   COMP METABOLIC PANEL   Result Value Ref Range    Sodium 139 135 - 145 mmol/L    Potassium 3.6 3.6 - 5.5 mmol/L    Chloride 103 96 - 112 mmol/L    Co2 24 20 - 33 mmol/L    Anion Gap 12.0 (H) 0.0 - 11.9    Glucose 226 (H) 65 - 99 mg/dL    Bun 19 8 - 22 mg/dL    Creatinine 1.13 0.50 - 1.40 mg/dL    Calcium 9.0 8.5 - 10.5 mg/dL    AST(SGOT) 19 12 - 45 U/L    ALT(SGPT) 28 2 - 50 U/L    Alkaline Phosphatase 67 30 - 99 U/L    Total Bilirubin 0.7 0.1 - 1.5 mg/dL    Albumin 4.1 3.2 - 4.9 g/dL    Total Protein 6.8 6.0 - 8.2 g/dL    Globulin 2.7 1.9 - 3.5 g/dL    A-G Ratio 1.5 g/dL   LACTIC ACID   Result Value Ref Range    Lactic Acid 3.8 (H) 0.5 - 2.0 mmol/L   proBrain Natriuretic Peptide, NT   Result Value Ref Range    NT-proBNP 143 (H) 0 - 125 pg/mL   TROPONIN   Result Value Ref Range    Troponin T <6 6 - 19 ng/L   Influenza By PCR, A/B   Result Value Ref Range    Influenza virus A RNA Negative Negative    Influenza virus B, PCR Negative Negative   ESTIMATED GFR   Result Value Ref Range    GFR If African American >60 >60 mL/min/1.73 m 2    GFR If Non African American >60 >60 mL/min/1.73 m 2   EKG   Result Value Ref Range    Report       [facility]    Test Date:  2020  Pt Name:    ROSANNA PHILLIP              Department: ER  MRN:        [id number]                      Room:       [facility]  Gender:     Male                         Technician: [id number]  :        1975                   Requested By:DANNY HERNANDEZ  Order #:    [id number]                    Reading MD: DANNY HERNANDEZ MD    Measurements  Intervals                                Axis  Rate:       90                           P:          38  WV:         156                          QRS:        34  QRSD:       86                            T:          29  QT:         356  QTc:        436    Interpretive Statements  SINUS RHYTHM  BASELINE WANDER IN LEAD(S) V4  No previous ECG available for comparison  Electronically Signed On 2- 15:30:58 PST by DANNY MOROCHO MD        3:58 PM.  I spoke with Dr. Canchola who agrees to admit the patient for further evaluation and treatment.    Disposition: Admit to Dr. Canchola in guarded condition    FINAL IMPRESSION  1. Sepsis without acute organ dysfunction, due to unspecified organism (HCC)    2. Pneumonia of right middle lobe due to infectious organism (HCC)               Electronically signed by: Danny Morocho M.D., 2/28/2020 3:07 PM

## 2020-02-28 NOTE — ED TRIAGE NOTES
44 y/o male sent from Urgent Care after having blood drawn today. Pt was advised to come to the ED for evaluation and treatment of pneumonia. Pt lactic acid at 1133 am today was 2.7. pt states he has been ill x 1 month.

## 2020-02-29 ENCOUNTER — APPOINTMENT (OUTPATIENT)
Dept: RADIOLOGY | Facility: MEDICAL CENTER | Age: 45
DRG: 871 | End: 2020-02-29
Attending: INTERNAL MEDICINE
Payer: COMMERCIAL

## 2020-02-29 PROBLEM — E11.65 TYPE 2 DIABETES MELLITUS WITH HYPERGLYCEMIA, WITHOUT LONG-TERM CURRENT USE OF INSULIN (HCC): Status: ACTIVE | Noted: 2020-02-29

## 2020-02-29 LAB
ANION GAP SERPL CALC-SCNC: 13 MMOL/L (ref 0–11.9)
ANION GAP SERPL CALC-SCNC: 17 MMOL/L (ref 0–11.9)
APPEARANCE UR: CLEAR
B-OH-BUTYR SERPL-MCNC: 0.08 MMOL/L (ref 0.02–0.27)
BASOPHILS # BLD AUTO: 0.3 % (ref 0–1.8)
BASOPHILS # BLD: 0.05 K/UL (ref 0–0.12)
BILIRUB UR QL STRIP.AUTO: NEGATIVE
BUN SERPL-MCNC: 22 MG/DL (ref 8–22)
BUN SERPL-MCNC: 23 MG/DL (ref 8–22)
CALCIUM SERPL-MCNC: 8.2 MG/DL (ref 8.5–10.5)
CALCIUM SERPL-MCNC: 8.4 MG/DL (ref 8.5–10.5)
CHLORIDE SERPL-SCNC: 104 MMOL/L (ref 96–112)
CHLORIDE SERPL-SCNC: 104 MMOL/L (ref 96–112)
CHOLEST SERPL-MCNC: 132 MG/DL (ref 100–199)
CO2 SERPL-SCNC: 17 MMOL/L (ref 20–33)
CO2 SERPL-SCNC: 20 MMOL/L (ref 20–33)
COLOR UR: YELLOW
CREAT SERPL-MCNC: 1.12 MG/DL (ref 0.5–1.4)
CREAT SERPL-MCNC: 1.15 MG/DL (ref 0.5–1.4)
EOSINOPHIL # BLD AUTO: 0 K/UL (ref 0–0.51)
EOSINOPHIL NFR BLD: 0 % (ref 0–6.9)
ERYTHROCYTE [DISTWIDTH] IN BLOOD BY AUTOMATED COUNT: 39.7 FL (ref 35.9–50)
ERYTHROCYTE [DISTWIDTH] IN BLOOD BY AUTOMATED COUNT: 39.8 FL (ref 35.9–50)
GLUCOSE BLD-MCNC: 159 MG/DL (ref 65–99)
GLUCOSE BLD-MCNC: 243 MG/DL (ref 65–99)
GLUCOSE BLD-MCNC: 258 MG/DL (ref 65–99)
GLUCOSE BLD-MCNC: 267 MG/DL (ref 65–99)
GLUCOSE SERPL-MCNC: 275 MG/DL (ref 65–99)
GLUCOSE SERPL-MCNC: 322 MG/DL (ref 65–99)
GLUCOSE UR STRIP.AUTO-MCNC: 250 MG/DL
HCT VFR BLD AUTO: 40 % (ref 42–52)
HCT VFR BLD AUTO: 41.2 % (ref 42–52)
HDLC SERPL-MCNC: 38 MG/DL
HGB BLD-MCNC: 13.5 G/DL (ref 14–18)
HGB BLD-MCNC: 14 G/DL (ref 14–18)
IMM GRANULOCYTES # BLD AUTO: 0.28 K/UL (ref 0–0.11)
IMM GRANULOCYTES NFR BLD AUTO: 1.7 % (ref 0–0.9)
KETONES UR STRIP.AUTO-MCNC: NEGATIVE MG/DL
LACTATE BLD-SCNC: 2.5 MMOL/L (ref 0.5–2)
LACTATE BLD-SCNC: 2.9 MMOL/L (ref 0.5–2)
LACTATE BLD-SCNC: 3.6 MMOL/L (ref 0.5–2)
LACTATE BLD-SCNC: 4.3 MMOL/L (ref 0.5–2)
LACTATE BLD-SCNC: 4.4 MMOL/L (ref 0.5–2)
LACTATE BLD-SCNC: 5.4 MMOL/L (ref 0.5–2)
LACTATE BLD-SCNC: 7.3 MMOL/L (ref 0.5–2)
LDLC SERPL CALC-MCNC: 78 MG/DL
LEUKOCYTE ESTERASE UR QL STRIP.AUTO: NEGATIVE
LYMPHOCYTES # BLD AUTO: 2.69 K/UL (ref 1–4.8)
LYMPHOCYTES NFR BLD: 16.6 % (ref 22–41)
MAGNESIUM SERPL-MCNC: 1.7 MG/DL (ref 1.5–2.5)
MAGNESIUM SERPL-MCNC: 1.8 MG/DL (ref 1.5–2.5)
MAGNESIUM SERPL-MCNC: 2 MG/DL (ref 1.5–2.5)
MCH RBC QN AUTO: 27.7 PG (ref 27–33)
MCH RBC QN AUTO: 28 PG (ref 27–33)
MCHC RBC AUTO-ENTMCNC: 33.8 G/DL (ref 33.7–35.3)
MCHC RBC AUTO-ENTMCNC: 34 G/DL (ref 33.7–35.3)
MCV RBC AUTO: 82.1 FL (ref 81.4–97.8)
MCV RBC AUTO: 82.4 FL (ref 81.4–97.8)
MICRO URNS: ABNORMAL
MONOCYTES # BLD AUTO: 0.68 K/UL (ref 0–0.85)
MONOCYTES NFR BLD AUTO: 4.2 % (ref 0–13.4)
NEUTROPHILS # BLD AUTO: 12.5 K/UL (ref 1.82–7.42)
NEUTROPHILS NFR BLD: 77.2 % (ref 44–72)
NITRITE UR QL STRIP.AUTO: NEGATIVE
NRBC # BLD AUTO: 0 K/UL
NRBC BLD-RTO: 0 /100 WBC
PH UR STRIP.AUTO: 5 [PH] (ref 5–8)
PHOSPHATE SERPL-MCNC: 2.6 MG/DL (ref 2.5–4.5)
PHOSPHATE SERPL-MCNC: 3.1 MG/DL (ref 2.5–4.5)
PLATELET # BLD AUTO: 213 K/UL (ref 164–446)
PLATELET # BLD AUTO: 240 K/UL (ref 164–446)
PMV BLD AUTO: 10.3 FL (ref 9–12.9)
PMV BLD AUTO: 9.9 FL (ref 9–12.9)
POTASSIUM SERPL-SCNC: 3.8 MMOL/L (ref 3.6–5.5)
POTASSIUM SERPL-SCNC: 4.1 MMOL/L (ref 3.6–5.5)
PROCALCITONIN SERPL-MCNC: 0.06 NG/ML
PROT UR QL STRIP: NEGATIVE MG/DL
RBC # BLD AUTO: 4.87 M/UL (ref 4.7–6.1)
RBC # BLD AUTO: 5 M/UL (ref 4.7–6.1)
RBC UR QL AUTO: NEGATIVE
SODIUM SERPL-SCNC: 137 MMOL/L (ref 135–145)
SODIUM SERPL-SCNC: 138 MMOL/L (ref 135–145)
SP GR UR REFRACTOMETRY: 1.05
TRIGL SERPL-MCNC: 79 MG/DL (ref 0–149)
UROBILINOGEN UR STRIP.AUTO-MCNC: 0.2 MG/DL
WBC # BLD AUTO: 15.5 K/UL (ref 4.8–10.8)
WBC # BLD AUTO: 16.2 K/UL (ref 4.8–10.8)

## 2020-02-29 PROCEDURE — 82962 GLUCOSE BLOOD TEST: CPT | Mod: 91

## 2020-02-29 PROCEDURE — 700105 HCHG RX REV CODE 258: Performed by: HOSPITALIST

## 2020-02-29 PROCEDURE — 83735 ASSAY OF MAGNESIUM: CPT

## 2020-02-29 PROCEDURE — 700101 HCHG RX REV CODE 250: Performed by: HOSPITALIST

## 2020-02-29 PROCEDURE — 80048 BASIC METABOLIC PNL TOTAL CA: CPT | Mod: 91

## 2020-02-29 PROCEDURE — 700102 HCHG RX REV CODE 250 W/ 637 OVERRIDE(OP): Performed by: HOSPITALIST

## 2020-02-29 PROCEDURE — 770022 HCHG ROOM/CARE - ICU (200)

## 2020-02-29 PROCEDURE — 84100 ASSAY OF PHOSPHORUS: CPT

## 2020-02-29 PROCEDURE — 83605 ASSAY OF LACTIC ACID: CPT | Mod: 91

## 2020-02-29 PROCEDURE — 700117 HCHG RX CONTRAST REV CODE 255: Performed by: INTERNAL MEDICINE

## 2020-02-29 PROCEDURE — 700101 HCHG RX REV CODE 250: Performed by: INTERNAL MEDICINE

## 2020-02-29 PROCEDURE — 700105 HCHG RX REV CODE 258: Performed by: INTERNAL MEDICINE

## 2020-02-29 PROCEDURE — 700111 HCHG RX REV CODE 636 W/ 250 OVERRIDE (IP): Performed by: HOSPITALIST

## 2020-02-29 PROCEDURE — 81003 URINALYSIS AUTO W/O SCOPE: CPT

## 2020-02-29 PROCEDURE — 99291 CRITICAL CARE FIRST HOUR: CPT | Performed by: INTERNAL MEDICINE

## 2020-02-29 PROCEDURE — 82010 KETONE BODYS QUAN: CPT

## 2020-02-29 PROCEDURE — 700111 HCHG RX REV CODE 636 W/ 250 OVERRIDE (IP): Performed by: INTERNAL MEDICINE

## 2020-02-29 PROCEDURE — 85027 COMPLETE CBC AUTOMATED: CPT

## 2020-02-29 PROCEDURE — 71275 CT ANGIOGRAPHY CHEST: CPT

## 2020-02-29 PROCEDURE — 80061 LIPID PANEL: CPT

## 2020-02-29 PROCEDURE — 84145 PROCALCITONIN (PCT): CPT

## 2020-02-29 PROCEDURE — 700105 HCHG RX REV CODE 258: Performed by: PSYCHIATRY & NEUROLOGY

## 2020-02-29 PROCEDURE — 85025 COMPLETE CBC W/AUTO DIFF WBC: CPT

## 2020-02-29 PROCEDURE — A9270 NON-COVERED ITEM OR SERVICE: HCPCS | Performed by: HOSPITALIST

## 2020-02-29 PROCEDURE — 94640 AIRWAY INHALATION TREATMENT: CPT

## 2020-02-29 RX ORDER — SODIUM CHLORIDE 9 MG/ML
500 INJECTION, SOLUTION INTRAVENOUS ONCE
Status: COMPLETED | OUTPATIENT
Start: 2020-02-29 | End: 2020-02-29

## 2020-02-29 RX ORDER — SODIUM CHLORIDE, SODIUM LACTATE, POTASSIUM CHLORIDE, AND CALCIUM CHLORIDE .6; .31; .03; .02 G/100ML; G/100ML; G/100ML; G/100ML
500 INJECTION, SOLUTION INTRAVENOUS ONCE
Status: COMPLETED | OUTPATIENT
Start: 2020-02-29 | End: 2020-02-29

## 2020-02-29 RX ORDER — METHYLPREDNISOLONE SODIUM SUCCINATE 40 MG/ML
40 INJECTION, POWDER, LYOPHILIZED, FOR SOLUTION INTRAMUSCULAR; INTRAVENOUS EVERY 6 HOURS
Status: DISCONTINUED | OUTPATIENT
Start: 2020-02-29 | End: 2020-03-02 | Stop reason: HOSPADM

## 2020-02-29 RX ORDER — IPRATROPIUM BROMIDE AND ALBUTEROL SULFATE 2.5; .5 MG/3ML; MG/3ML
3 SOLUTION RESPIRATORY (INHALATION)
Status: DISCONTINUED | OUTPATIENT
Start: 2020-02-29 | End: 2020-03-02 | Stop reason: HOSPADM

## 2020-02-29 RX ORDER — SODIUM CHLORIDE, SODIUM LACTATE, POTASSIUM CHLORIDE, CALCIUM CHLORIDE 600; 310; 30; 20 MG/100ML; MG/100ML; MG/100ML; MG/100ML
INJECTION, SOLUTION INTRAVENOUS ONCE
Status: COMPLETED | OUTPATIENT
Start: 2020-02-29 | End: 2020-02-29

## 2020-02-29 RX ORDER — IPRATROPIUM BROMIDE AND ALBUTEROL SULFATE 2.5; .5 MG/3ML; MG/3ML
3 SOLUTION RESPIRATORY (INHALATION)
Status: DISCONTINUED | OUTPATIENT
Start: 2020-02-29 | End: 2020-02-29

## 2020-02-29 RX ADMIN — LABETALOL HYDROCHLORIDE 10 MG: 5 INJECTION, SOLUTION INTRAVENOUS at 02:03

## 2020-02-29 RX ADMIN — METHYLPREDNISOLONE SODIUM SUCCINATE 40 MG: 40 INJECTION, POWDER, FOR SOLUTION INTRAMUSCULAR; INTRAVENOUS at 13:00

## 2020-02-29 RX ADMIN — IOHEXOL 40 ML: 350 INJECTION, SOLUTION INTRAVENOUS at 04:28

## 2020-02-29 RX ADMIN — LABETALOL HYDROCHLORIDE 10 MG: 5 INJECTION, SOLUTION INTRAVENOUS at 00:56

## 2020-02-29 RX ADMIN — INSULIN HUMAN 9 UNITS: 100 INJECTION, SOLUTION PARENTERAL at 20:58

## 2020-02-29 RX ADMIN — IPRATROPIUM BROMIDE AND ALBUTEROL SULFATE 3 ML: .5; 3 SOLUTION RESPIRATORY (INHALATION) at 04:55

## 2020-02-29 RX ADMIN — SODIUM CHLORIDE, POTASSIUM CHLORIDE, SODIUM LACTATE AND CALCIUM CHLORIDE: 600; 310; 30; 20 INJECTION, SOLUTION INTRAVENOUS at 05:30

## 2020-02-29 RX ADMIN — INSULIN HUMAN 6 UNITS: 100 INJECTION, SOLUTION PARENTERAL at 18:02

## 2020-02-29 RX ADMIN — SODIUM CHLORIDE 500 ML: 9 INJECTION, SOLUTION INTRAVENOUS at 03:00

## 2020-02-29 RX ADMIN — INSULIN HUMAN 3 UNITS: 100 INJECTION, SOLUTION PARENTERAL at 06:29

## 2020-02-29 RX ADMIN — METHYLPREDNISOLONE SODIUM SUCCINATE 40 MG: 40 INJECTION, POWDER, FOR SOLUTION INTRAMUSCULAR; INTRAVENOUS at 06:25

## 2020-02-29 RX ADMIN — METHYLPREDNISOLONE SODIUM SUCCINATE 40 MG: 40 INJECTION, POWDER, FOR SOLUTION INTRAMUSCULAR; INTRAVENOUS at 17:54

## 2020-02-29 RX ADMIN — INSULIN HUMAN 9 UNITS: 100 INJECTION, SOLUTION PARENTERAL at 13:10

## 2020-02-29 RX ADMIN — CEFTRIAXONE SODIUM 2 G: 2 INJECTION, POWDER, FOR SOLUTION INTRAMUSCULAR; INTRAVENOUS at 06:20

## 2020-02-29 RX ADMIN — AZITHROMYCIN MONOHYDRATE 500 MG: 250 TABLET ORAL at 06:20

## 2020-02-29 RX ADMIN — SODIUM CHLORIDE, POTASSIUM CHLORIDE, SODIUM LACTATE AND CALCIUM CHLORIDE 500 ML: 600; 310; 30; 20 INJECTION, SOLUTION INTRAVENOUS at 13:54

## 2020-02-29 ASSESSMENT — ENCOUNTER SYMPTOMS
NERVOUS/ANXIOUS: 0
ABDOMINAL PAIN: 0
POLYDIPSIA: 1
BLURRED VISION: 0
DIZZINESS: 0
BLOOD IN STOOL: 0
VOMITING: 0
BRUISES/BLEEDS EASILY: 0
WHEEZING: 1
FLANK PAIN: 0
DEPRESSION: 0
DIARRHEA: 1
COUGH: 1
SPUTUM PRODUCTION: 1
SENSORY CHANGE: 0
PALPITATIONS: 0
NAUSEA: 1
SPEECH CHANGE: 0
BACK PAIN: 0
HEADACHES: 0
NECK PAIN: 0
FEVER: 0
SORE THROAT: 0
HEMOPTYSIS: 0
CHILLS: 0
SHORTNESS OF BREATH: 1

## 2020-02-29 ASSESSMENT — COPD QUESTIONNAIRES
HAVE YOU SMOKED AT LEAST 100 CIGARETTES IN YOUR ENTIRE LIFE: YES
DO YOU EVER COUGH UP ANY MUCUS OR PHLEGM?: YES, A FEW DAYS A WEEK OR MONTH
DURING THE PAST 4 WEEKS HOW MUCH DID YOU FEEL SHORT OF BREATH: SOME OF THE TIME
COPD SCREENING SCORE: 5

## 2020-02-29 ASSESSMENT — LIFESTYLE VARIABLES: EVER_SMOKED: YES

## 2020-02-29 ASSESSMENT — PATIENT HEALTH QUESTIONNAIRE - PHQ9
1. LITTLE INTEREST OR PLEASURE IN DOING THINGS: NOT AT ALL
SUM OF ALL RESPONSES TO PHQ9 QUESTIONS 1 AND 2: 0
2. FEELING DOWN, DEPRESSED, IRRITABLE, OR HOPELESS: NOT AT ALL

## 2020-02-29 ASSESSMENT — FIBROSIS 4 INDEX: FIB4 SCORE: 0.7

## 2020-02-29 NOTE — H&P
Hospital Medicine History & Physical Note    Date of Service  2/28/2020    Primary Care Physician  Pcp Pt States None    Consultants  None    Code Status  Full code    Chief Complaint  Shortness of breath    History of Presenting Illness  45 y.o. male who presented 2/28/2020 with failed outpatient management of community-acquired pneumonia.  The patient has worsening shortness of breath, worsening cough, worsening rhonchi in the lungs.  Patient at this point has elevated lactic acid level elevated white blood cell count tachycardia and a source.  The patient is at this point septic.  Patient this will be admitted to the telemetry unit for sepsis management.  Patient was given fluid resuscitation IV antibiotics and pain management as needed.  Will monitor procalcitonin level lactic acid level.    Review of Systems  Review of Systems   Constitutional: Positive for malaise/fatigue. Negative for chills, diaphoresis and fever.   HENT: Negative.    Eyes: Negative.  Negative for double vision.   Respiratory: Positive for cough, sputum production and shortness of breath. Negative for hemoptysis and wheezing.    Cardiovascular: Positive for chest pain. Negative for palpitations and leg swelling.   Gastrointestinal: Negative.  Negative for abdominal pain, blood in stool, constipation, diarrhea, heartburn, nausea and vomiting.   Genitourinary: Positive for dysuria. Negative for frequency, hematuria and urgency.   Musculoskeletal: Positive for back pain. Negative for joint pain.   Skin: Negative.  Negative for itching and rash.   Neurological: Negative.  Negative for dizziness, focal weakness, seizures, loss of consciousness and headaches.   Endo/Heme/Allergies: Negative.  Does not bruise/bleed easily.   Psychiatric/Behavioral: Positive for substance abuse (THC gummies for insomnia). Negative for suicidal ideas. The patient has insomnia. The patient is not nervous/anxious.    All other systems reviewed and are  negative.      Past Medical History  History of pneumonia 5 years ago with sepsis  History of benign prostatic hypertrophy 5 years ago.  Morbid obesity    Surgical History  No surgeries per patient    Family History  Father has hypertension  Mother has history of kidney stone and gallstone    Social History   reports that he has quit smoking. His smoking use included cigarettes. He smoked 0.50 packs per day. He quit smokeless tobacco use about 4 years ago. He reports previous alcohol use. He reports previous current drug use with THC Gummies    Allergies  No Known Allergies    Medications  Prior to Admission Medications   Prescriptions Last Dose Informant Patient Reported? Taking?   amoxicillin-clavulanate (AUGMENTIN) 875-125 MG Tab 2/27/2020 at pm Patient Yes Yes   Sig: Take 1 Tab by mouth 2 times a day. 5 day course   benzonatate (TESSALON) 100 MG Cap 2/28/2020 at 0000 Patient Yes No   Sig: Take 100 mg by mouth 3 times a day as needed for Cough.   doxycycline (VIBRAMYCIN) 100 MG Tab 2/27/2020 at pm Patient Yes Yes   Sig: Take 100 mg by mouth 2 times a day. 5 day course   methylPREDNISolone (MEDROL DOSEPAK) 4 MG Tablet Therapy Pack 2/27/2020 at pm Patient No No   Sig: Follow schedule on package instructions.      Facility-Administered Medications: None       Physical Exam  Temp:  [36.2 °C (97.2 °F)-37 °C (98.6 °F)] 36.7 °C (98.1 °F)  Pulse:  [] 97  Resp:  [16-20] 18  BP: (107-137)/(59-95) 124/67  SpO2:  [93 %-98 %] 98 %    Physical Exam  Vitals signs and nursing note reviewed. Exam conducted with a chaperone present.   Constitutional:       Appearance: He is well-developed. He is obese. He is ill-appearing and diaphoretic.   HENT:      Head: Normocephalic and atraumatic.      Right Ear: External ear normal.      Left Ear: External ear normal.      Nose: Nose normal.      Mouth/Throat:      Mouth: Mucous membranes are moist.      Pharynx: Oropharynx is clear.   Eyes:      Conjunctiva/sclera: Conjunctivae  normal.      Pupils: Pupils are equal, round, and reactive to light.   Neck:      Musculoskeletal: Normal range of motion and neck supple.      Thyroid: No thyromegaly.      Vascular: No JVD.   Cardiovascular:      Rate and Rhythm: Regular rhythm. Tachycardia present.      Pulses: Normal pulses.      Heart sounds: Normal heart sounds.   Pulmonary:      Effort: Accessory muscle usage present.      Breath sounds: Decreased air movement present. Examination of the right-upper field reveals rales. Examination of the left-upper field reveals rales. Examination of the right-middle field reveals rales. Examination of the left-middle field reveals rales. Examination of the right-lower field reveals rales. Examination of the left-lower field reveals rales. Rales present.   Chest:      Chest wall: No tenderness.   Abdominal:      General: Abdomen is protuberant. Bowel sounds are normal. There is distension (from obesity).      Palpations: Abdomen is soft. There is no mass.      Tenderness: There is no abdominal tenderness. There is no guarding or rebound.   Musculoskeletal: Normal range of motion.         General: No tenderness.      Right lower leg: No edema.      Left lower leg: No edema.   Lymphadenopathy:      Cervical: No cervical adenopathy.   Skin:     General: Skin is warm.      Capillary Refill: Capillary refill takes 2 to 3 seconds.      Findings: No rash.   Neurological:      General: No focal deficit present.      Mental Status: He is alert and oriented to person, place, and time. Mental status is at baseline.      Cranial Nerves: No cranial nerve deficit.      Deep Tendon Reflexes: Reflexes are normal and symmetric.   Psychiatric:         Attention and Perception: Attention and perception normal.         Mood and Affect: Affect normal. Mood is anxious.         Speech: Speech normal.         Behavior: Behavior normal. Behavior is cooperative.         Thought Content: Thought content normal.         Cognition and  Memory: Cognition normal.         Judgment: Judgment normal.         Laboratory:  Recent Labs     02/28/20  1133 02/28/20  1504   WBC 18.2* 17.0*   RBC 5.58 5.50   HEMOGLOBIN 16.0 15.2   HEMATOCRIT 45.2 44.6   MCV 81.0* 81.1*   MCH 28.7 27.6   MCHC 35.4* 34.1   RDW 37.5 38.3   PLATELETCT 239 252   MPV 9.8 10.0     Recent Labs     02/28/20  1133 02/28/20  1504   SODIUM 139 139   POTASSIUM 3.9 3.6   CHLORIDE 104 103   CO2 25 24   GLUCOSE 153* 226*   BUN 18 19   CREATININE 1.09 1.13   CALCIUM 9.1 9.0     Recent Labs     02/28/20  1133 02/28/20  1504   ALTSGPT 29 28   ASTSGOT 16 19   ALKPHOSPHAT 72 67   TBILIRUBIN 0.8 0.7   GLUCOSE 153* 226*     Recent Labs     02/28/20  1504   INR 0.98     Recent Labs     02/28/20  1504   NTPROBNP 143*         Recent Labs     02/28/20  1504 02/28/20  1617   TROPONINT <6 <6       Urinalysis:    No results found     Imaging:  DX-CHEST-PORTABLE (1 VIEW)   Final Result      Right basilar opacity may represent atelectasis but pneumonitis is not excluded.               Assessment/Plan:  I anticipate this patient will require at least two midnights for appropriate medical management, necessitating inpatient admission.    * Sepsis (HCC)  Assessment & Plan  This is Sepsis Present on admission  SIRS criteria identified on my evaluation include: Tachycardia, with heart rate greater than 90 BPM and Leukocyosis, with WBC greater than 12,000  Source is pneumonia  Sepsis protocol initiated  Fluid resuscitation ordered per protocol  IV antibiotics as appropriate for source of sepsis  While organ dysfunction may be noted elsewhere in this problem list or in the chart, degree of organ dysfunction does not meet CMS criteria for severe sepsis          CAP (community acquired pneumonia)- (present on admission)  Assessment & Plan  Patient has been placed on azithromycin and Rocephin for community-acquired pneumonia.  Patient has been placed on guaifenesin for mucus lysis  Patient at home failed outpatient  antibiotic management with Augmentin  Patient was also on Tessalon Perles as an outpatient this may have worsened the pneumonia.  Patient at this point will need continued aggressive fluid resuscitation and RT protocol and oxygen support.    Lactic acidosis  Assessment & Plan  Monitor lactic acid levels.  So far they have not improving.  Initially was 2.7 that went up to 3.8 then came down to 3.2 note is at 3.6.  Continue to monitor also continue to monitor procalcitonin levels initial one is 0.17    Hyperglycemia  Assessment & Plan  Secondary to stress response.  Check a hemoglobin A1c however as patient's blood sugars have been elevated in the past as well.    Leukocytosis  Assessment & Plan  Secondary to the pneumonia continue to monitor white blood cell count    Class 3 severe obesity in adult (HCC)  Assessment & Plan  Patient will benefit from outpatient weight loss management program.  Body mass index is 43.58 kg/m².        BPH (benign prostatic hyperplasia)  Assessment & Plan  Patient in the past has had problems with his prostate, start Flomax      VTE prophylaxis: SCDs

## 2020-02-29 NOTE — PROGRESS NOTES
2 RN skin check with RN Roseanna  Skin intact  Ears, elbows, heels, and sacrum all pink and blanching

## 2020-02-29 NOTE — PROGRESS NOTES
Patient educated on importance of fall precautions, standby assist during mobility. Patient states he is unable to void with another person in the room. Patient re-educated on importance of fall precautions and risk of falls. Patient continues to refuse a standby assist. Dr. Ghosh and charge RN notified.

## 2020-02-29 NOTE — DIETARY
NUTRITION SERVICES: Pt with morbid obesity as evidenced by BMI >40 (43.58). Weight loss counseling not appropriate in acute care setting. Recommend referral to outpatient nutrition services for weight management after D/C, if pt desires.

## 2020-02-29 NOTE — CONSULTS
Critical Care Consultation    Date of consult: 2/29/2020    Referring Physician  Robin Ghosh M.D.    Reason for Consultation  Sepsis, lactic acidosis, hyperglycemia    History of Presenting Illness  45 y.o. male who presented 2/28/2020 with a past medical history significant for diet-controlled diabetes, asthma and recurrent pneumonia over the last 5 years who has been battling a cough with associated shortness of breath and wheezing for the last month.  He has been prescribed antibiotics (Augmentin/doxycycline) and inhaler and a Medrol Dosepak as an outpatient without any improvement.  He presented to the emergency department after having outpatient labs revealing an elevated white blood cell count as well as lactic acidosis.  He was diagnosed with sepsis and pneumonia after a chest x-ray revealed worsening infiltrates and he was given IV antibiotics and crystalloids and admitted to the hospital.  This evening patient's lactic acid level worsened up to greater than 7 and the covering hospitalist evaluated the patient and felt he needed to be transferred to the ICU.  On evaluation during our conversation it was also discovered that he has worsening hyperglycemia, anion gap metabolic acidosis and possible DKA.  Labs have been sent to further evaluate this.    Code Status  Full Code    Review of Systems  Review of Systems   Constitutional: Negative for chills, fever and malaise/fatigue.   HENT: Negative for congestion and sore throat.    Eyes: Negative for blurred vision.   Respiratory: Positive for cough, sputum production, shortness of breath and wheezing. Negative for hemoptysis.    Cardiovascular: Negative for chest pain, palpitations and leg swelling.   Gastrointestinal: Positive for diarrhea and nausea. Negative for abdominal pain, blood in stool and vomiting.   Genitourinary: Positive for frequency. Negative for dysuria and flank pain.   Musculoskeletal: Negative for back pain and neck pain.   Skin:  "Negative for rash.   Neurological: Negative for dizziness, sensory change, speech change and headaches.   Endo/Heme/Allergies: Positive for polydipsia. Does not bruise/bleed easily.   Psychiatric/Behavioral: Negative for depression. The patient is not nervous/anxious.    All other systems reviewed and are negative.      Past Medical History   has no past medical history on file. -Diet-controlled diabetes, asthma, recurrent pneumonia, \"hole in heart\"    Surgical History   has no past surgical history on file. -None    Family History  family history is not on file. -No history of early lung disease, cardiac disease    Social History   reports that he has quit smoking. His smoking use included cigarettes. He smoked 0.00 packs per day. He quit smokeless tobacco use about 4 years ago. He reports previous alcohol use. He reports previous drug use. -Patient works in a [email or web address] and has been exposed to secondhand smoke.    Medications  Home Medications     Reviewed by Anjana Carr M.D. (Physician) on 02/28/20 at 1608  Med List Status: Complete   Medication Last Dose Status   amoxicillin-clavulanate (AUGMENTIN) 875-125 MG Tab 2/27/2020 Active   benzonatate (TESSALON) 100 MG Cap 2/28/2020 Active   doxycycline (VIBRAMYCIN) 100 MG Tab 2/27/2020 Active   methylPREDNISolone (MEDROL DOSEPAK) 4 MG Tablet Therapy Pack 2/27/2020 Active              Current Facility-Administered Medications   Medication Dose Route Frequency Provider Last Rate Last Dose   • NS (BOLUS) infusion 500 mL  500 mL Intravenous Once Ligia Kate M.D.       • predniSONE (DELTASONE) tablet 60 mg  60 mg Oral DAILY Brittany Morocho M.D.   60 mg at 02/28/20 1605   • acetaminophen (TYLENOL) tablet 650 mg  650 mg Oral Q6HRS PRN Anjana Carr M.D.       • labetalol (NORMODYNE/TRANDATE) injection 10 mg  10 mg Intravenous Q4HRS PRN Anjana Carr M.D.   10 mg at 02/29/20 0203   • senna-docusate (PERICOLACE or SENOKOT S) 8.6-50 MG per tablet 2 Tab  2 Tab Oral " BID Anjana Carr M.D.        And   • polyethylene glycol/lytes (MIRALAX) PACKET 1 Packet  1 Packet Oral QDAY PRN Anjana Carr M.D.        And   • magnesium hydroxide (MILK OF MAGNESIA) suspension 30 mL  30 mL Oral QDAY PRN Anjana Carr M.D.        And   • bisacodyl (DULCOLAX) suppository 10 mg  10 mg Rectal QDAY PRN Anjana Carr M.D.       • lactated ringers infusion (BOLUS)  500 mL Intravenous Once PRN Anjana Carr M.D.       • lactated ringers infusion   Intravenous Continuous Anjana Carr M.D.   Stopped at 02/28/20 1630   • cefTRIAXone (ROCEPHIN) 2 g in  mL IVPB  2 g Intravenous Q24HRS Anjana Carr M.D.       • azithromycin (ZITHROMAX) tablet 500 mg  500 mg Oral DAILY Anjana Carr M.D.   500 mg at 02/28/20 1807   • ondansetron (ZOFRAN) syringe/vial injection 4 mg  4 mg Intravenous Q4HRS PRN Anjana Carr M.D.       • ondansetron (ZOFRAN ODT) dispertab 4 mg  4 mg Oral Q4HRS PRN Anjana Carr M.D.       • promethazine (PHENERGAN) tablet 12.5-25 mg  12.5-25 mg Oral Q4HRS PRN Anjana Carr M.D.       • promethazine (PHENERGAN) suppository 12.5-25 mg  12.5-25 mg Rectal Q4HRS PRN Anjana Carr M.D.       • prochlorperazine (COMPAZINE) injection 5-10 mg  5-10 mg Intravenous Q4HRS PRN Anjana Carr M.D.       • guaiFENesin dextromethorphan (ROBITUSSIN DM) 100-10 MG/5ML syrup 10 mL  10 mL Oral Q6HRS PRN Anjana Carr M.D.   10 mL at 02/28/20 1827   • insulin regular (HUMULIN R) injection 3-18 Units  3-18 Units Subcutaneous 4X/DAY ACHCANELO Carr M.D.   9 Units at 02/28/20 2100   • tamsulosin (FLOMAX) capsule 0.4 mg  0.4 mg Oral AFTER BREAKFAST Anjana Carr M.D.           Allergies  No Known Allergies    Vital Signs last 24 hours  Temp:  [36.2 °C (97.2 °F)-37 °C (98.6 °F)] 36.5 °C (97.7 °F)  Pulse:  [] 87  Resp:  [16-20] 18  BP: (107-154)/(59-95) 154/82  SpO2:  [93 %-98 %] 95 %    Physical Exam  Physical Exam  Vitals signs and nursing note reviewed.   Constitutional:       General:  He is awake.      Appearance: Normal appearance. He is overweight. He is ill-appearing. He is not toxic-appearing.   HENT:      Head: Normocephalic and atraumatic.      Right Ear: External ear normal.      Left Ear: External ear normal.      Nose: Nose normal. No congestion.      Mouth/Throat:      Mouth: Mucous membranes are dry.      Pharynx: Oropharynx is clear. No oropharyngeal exudate.   Eyes:      General: No scleral icterus.     Conjunctiva/sclera: Conjunctivae normal.      Pupils: Pupils are equal, round, and reactive to light.   Neck:      Musculoskeletal: Neck supple. No neck rigidity.   Cardiovascular:      Rate and Rhythm: Normal rate and regular rhythm.      Pulses: Normal pulses.      Heart sounds: Normal heart sounds. No murmur.   Pulmonary:      Effort: Pulmonary effort is normal. No respiratory distress.      Breath sounds: Rhonchi present. No wheezing or rales.      Comments: Dry cough on exam  Abdominal:      General: Bowel sounds are normal. There is no distension.      Palpations: Abdomen is soft.      Tenderness: There is no abdominal tenderness. There is no guarding.   Musculoskeletal:         General: No tenderness.      Right lower leg: No edema.      Left lower leg: No edema.      Comments: No clubbing or cyanosis   Skin:     General: Skin is warm and dry.      Capillary Refill: Capillary refill takes less than 2 seconds.      Findings: No rash.   Neurological:      General: No focal deficit present.      Mental Status: He is alert and oriented to person, place, and time.      Cranial Nerves: No cranial nerve deficit.   Psychiatric:         Mood and Affect: Mood normal.         Behavior: Behavior normal. Behavior is cooperative.         Thought Content: Thought content normal.         Fluids    Intake/Output Summary (Last 24 hours) at 2/29/2020 0332  Last data filed at 2/28/2020 1644  Gross per 24 hour   Intake 1100 ml   Output --   Net 1100 ml       Laboratory  Recent Results (from the  past 48 hour(s))   CBC WITH DIFFERENTIAL    Collection Time: 02/28/20 11:33 AM   Result Value Ref Range    WBC 18.2 (H) 4.8 - 10.8 K/uL    RBC 5.58 4.70 - 6.10 M/uL    Hemoglobin 16.0 14.0 - 18.0 g/dL    Hematocrit 45.2 42.0 - 52.0 %    MCV 81.0 (L) 81.4 - 97.8 fL    MCH 28.7 27.0 - 33.0 pg    MCHC 35.4 (H) 33.7 - 35.3 g/dL    RDW 37.5 35.9 - 50.0 fL    Platelet Count 239 164 - 446 K/uL    MPV 9.8 9.0 - 12.9 fL    Neutrophils-Polys 72.10 (H) 44.00 - 72.00 %    Lymphocytes 20.40 (L) 22.00 - 41.00 %    Monocytes 5.20 0.00 - 13.40 %    Eosinophils 0.20 0.00 - 6.90 %    Basophils 0.50 0.00 - 1.80 %    Immature Granulocytes 1.60 (H) 0.00 - 0.90 %    Nucleated RBC 0.00 /100 WBC    Neutrophils (Absolute) 13.11 (H) 1.82 - 7.42 K/uL    Lymphs (Absolute) 3.72 1.00 - 4.80 K/uL    Monos (Absolute) 0.95 (H) 0.00 - 0.85 K/uL    Eos (Absolute) 0.04 0.00 - 0.51 K/uL    Baso (Absolute) 0.09 0.00 - 0.12 K/uL    Immature Granulocytes (abs) 0.29 (H) 0.00 - 0.11 K/uL    NRBC (Absolute) 0.00 K/uL   Comp Metabolic Panel    Collection Time: 02/28/20 11:33 AM   Result Value Ref Range    Sodium 139 135 - 145 mmol/L    Potassium 3.9 3.6 - 5.5 mmol/L    Chloride 104 96 - 112 mmol/L    Co2 25 20 - 33 mmol/L    Anion Gap 10.0 0.0 - 11.9    Glucose 153 (H) 65 - 99 mg/dL    Bun 18 8 - 22 mg/dL    Creatinine 1.09 0.50 - 1.40 mg/dL    Calcium 9.1 8.5 - 10.5 mg/dL    AST(SGOT) 16 12 - 45 U/L    ALT(SGPT) 29 2 - 50 U/L    Alkaline Phosphatase 72 30 - 99 U/L    Total Bilirubin 0.8 0.1 - 1.5 mg/dL    Albumin 4.3 3.2 - 4.9 g/dL    Total Protein 7.3 6.0 - 8.2 g/dL    Globulin 3.0 1.9 - 3.5 g/dL    A-G Ratio 1.4 g/dL   HEMOGLOBIN A1C    Collection Time: 02/28/20 11:33 AM   Result Value Ref Range    Glycohemoglobin 8.6 (H) 0.0 - 5.6 %    Est Avg Glucose 200 mg/dL   LACTIC ACID    Collection Time: 02/28/20 11:33 AM   Result Value Ref Range    Lactic Acid 2.7 (H) 0.5 - 2.0 mmol/L   ESTIMATED GFR    Collection Time: 02/28/20 11:33 AM   Result Value Ref Range     GFR If African American >60 >60 mL/min/1.73 m 2    GFR If Non African American >60 >60 mL/min/1.73 m 2   CBC WITH DIFFERENTIAL    Collection Time: 02/28/20  3:04 PM   Result Value Ref Range    WBC 17.0 (H) 4.8 - 10.8 K/uL    RBC 5.50 4.70 - 6.10 M/uL    Hemoglobin 15.2 14.0 - 18.0 g/dL    Hematocrit 44.6 42.0 - 52.0 %    MCV 81.1 (L) 81.4 - 97.8 fL    MCH 27.6 27.0 - 33.0 pg    MCHC 34.1 33.7 - 35.3 g/dL    RDW 38.3 35.9 - 50.0 fL    Platelet Count 252 164 - 446 K/uL    MPV 10.0 9.0 - 12.9 fL    Neutrophils-Polys 68.50 44.00 - 72.00 %    Lymphocytes 24.20 22.00 - 41.00 %    Monocytes 4.90 0.00 - 13.40 %    Eosinophils 0.30 0.00 - 6.90 %    Basophils 0.50 0.00 - 1.80 %    Immature Granulocytes 1.60 (H) 0.00 - 0.90 %    Nucleated RBC 0.00 /100 WBC    Neutrophils (Absolute) 11.68 (H) 1.82 - 7.42 K/uL    Lymphs (Absolute) 4.12 1.00 - 4.80 K/uL    Monos (Absolute) 0.83 0.00 - 0.85 K/uL    Eos (Absolute) 0.05 0.00 - 0.51 K/uL    Baso (Absolute) 0.09 0.00 - 0.12 K/uL    Immature Granulocytes (abs) 0.27 (H) 0.00 - 0.11 K/uL    NRBC (Absolute) 0.00 K/uL   COMP METABOLIC PANEL    Collection Time: 02/28/20  3:04 PM   Result Value Ref Range    Sodium 139 135 - 145 mmol/L    Potassium 3.6 3.6 - 5.5 mmol/L    Chloride 103 96 - 112 mmol/L    Co2 24 20 - 33 mmol/L    Anion Gap 12.0 (H) 0.0 - 11.9    Glucose 226 (H) 65 - 99 mg/dL    Bun 19 8 - 22 mg/dL    Creatinine 1.13 0.50 - 1.40 mg/dL    Calcium 9.0 8.5 - 10.5 mg/dL    AST(SGOT) 19 12 - 45 U/L    ALT(SGPT) 28 2 - 50 U/L    Alkaline Phosphatase 67 30 - 99 U/L    Total Bilirubin 0.7 0.1 - 1.5 mg/dL    Albumin 4.1 3.2 - 4.9 g/dL    Total Protein 6.8 6.0 - 8.2 g/dL    Globulin 2.7 1.9 - 3.5 g/dL    A-G Ratio 1.5 g/dL   LACTIC ACID    Collection Time: 02/28/20  3:04 PM   Result Value Ref Range    Lactic Acid 3.8 (H) 0.5 - 2.0 mmol/L   proBrain Natriuretic Peptide, NT    Collection Time: 02/28/20  3:04 PM   Result Value Ref Range    NT-proBNP 143 (H) 0 - 125 pg/mL   TROPONIN     Collection Time: 20  3:04 PM   Result Value Ref Range    Troponin T <6 6 - 19 ng/L   Influenza By PCR, A/B    Collection Time: 20  3:04 PM   Result Value Ref Range    Influenza virus A RNA Negative Negative    Influenza virus B, PCR Negative Negative   ESTIMATED GFR    Collection Time: 20  3:04 PM   Result Value Ref Range    GFR If African American >60 >60 mL/min/1.73 m 2    GFR If Non African American >60 >60 mL/min/1.73 m 2   Prothrombin time (INR)    Collection Time: 20  3:04 PM   Result Value Ref Range    PT 13.1 12.0 - 14.6 sec    INR 0.98 0.87 - 1.13   Procalcitonin    Collection Time: 20  3:04 PM   Result Value Ref Range    Procalcitonin 0.17 <0.25 ng/mL   TSH    Collection Time: 20  3:04 PM   Result Value Ref Range    TSH 1.770 0.380 - 5.330 uIU/mL   FREE THYROXINE    Collection Time: 20  3:04 PM   Result Value Ref Range    Free T-4 0.79 0.53 - 1.43 ng/dL   HEMOGLOBIN A1C    Collection Time: 20  3:04 PM   Result Value Ref Range    Glycohemoglobin 8.6 (H) 0.0 - 5.6 %    Est Avg Glucose 200 mg/dL   CORTISOL    Collection Time: 20  3:04 PM   Result Value Ref Range    Cortisol 2.0 0.0 - 23.0 ug/dL   EKG    Collection Time: 20  3:23 PM   Result Value Ref Range    Report       [facility] Emergency Dept[facility]    Test Date:  2020  Pt Name:    ROSANNA PHILLIP              Department: ER  MRN:        [id number]                      Room:       [facility]  Gender:     Male                         Technician: [id number]  :        1975                   Requested By:DANNY HERNANDEZ  Order #:    [id number]                    Reading MD: DANNY HERNANDEZ MD    Measurements  Intervals                                Axis  Rate:       90                           P:          38  CO:         156                          QRS:        34  QRSD:       86                           T:          29  QT:         356  QTc:        436    Interpretive  Statements  SINUS RHYTHM  BASELINE WANDER IN LEAD(S) V4  No previous ECG available for comparison  Electronically Signed On 2- 15:30:58 PST by DANNY HERNANDEZ MD     Troponin    Collection Time: 02/28/20  4:17 PM   Result Value Ref Range    Troponin T <6 6 - 19 ng/L   Lactic Acid -STAT Once    Collection Time: 02/28/20  5:10 PM   Result Value Ref Range    Lactic Acid 3.2 (H) 0.5 - 2.0 mmol/L   ACCU-CHEK GLUCOSE    Collection Time: 02/28/20  6:23 PM   Result Value Ref Range    Glucose - Accu-Ck 156 (H) 65 - 99 mg/dL   LACTIC ACID    Collection Time: 02/28/20  7:31 PM   Result Value Ref Range    Lactic Acid 3.6 (H) 0.5 - 2.0 mmol/L   ACCU-CHEK GLUCOSE    Collection Time: 02/28/20  9:12 PM   Result Value Ref Range    Glucose - Accu-Ck 280 (H) 65 - 99 mg/dL   Lactic Acid Every four hours after STAT order    Collection Time: 02/29/20 12:20 AM   Result Value Ref Range    Lactic Acid 4.3 (HH) 0.5 - 2.0 mmol/L   PROCALCITONIN    Collection Time: 02/29/20 12:20 AM   Result Value Ref Range    Procalcitonin 0.06 <0.25 ng/mL   Basic Metabolic Panel (BMP)    Collection Time: 02/29/20 12:20 AM   Result Value Ref Range    Sodium 137 135 - 145 mmol/L    Potassium 4.1 3.6 - 5.5 mmol/L    Chloride 104 96 - 112 mmol/L    Co2 20 20 - 33 mmol/L    Glucose 322 (H) 65 - 99 mg/dL    Bun 23 (H) 8 - 22 mg/dL    Creatinine 1.15 0.50 - 1.40 mg/dL    Calcium 8.4 (L) 8.5 - 10.5 mg/dL    Anion Gap 13.0 (H) 0.0 - 11.9   CBC without Differential    Collection Time: 02/29/20 12:20 AM   Result Value Ref Range    WBC 15.5 (H) 4.8 - 10.8 K/uL    RBC 5.00 4.70 - 6.10 M/uL    Hemoglobin 14.0 14.0 - 18.0 g/dL    Hematocrit 41.2 (L) 42.0 - 52.0 %    MCV 82.4 81.4 - 97.8 fL    MCH 28.0 27.0 - 33.0 pg    MCHC 34.0 33.7 - 35.3 g/dL    RDW 39.8 35.9 - 50.0 fL    Platelet Count 213 164 - 446 K/uL    MPV 9.9 9.0 - 12.9 fL   Lipid Profile (Lipid Panel) Fasting    Collection Time: 02/29/20 12:20 AM   Result Value Ref Range    Cholesterol,Tot 132 100 - 199  mg/dL    Triglycerides 79 0 - 149 mg/dL    HDL 38 (A) >=40 mg/dL    LDL 78 <100 mg/dL   ESTIMATED GFR    Collection Time: 02/29/20 12:20 AM   Result Value Ref Range    GFR If African American >60 >60 mL/min/1.73 m 2    GFR If Non African American >60 >60 mL/min/1.73 m 2   LACTIC ACID    Collection Time: 02/29/20  2:05 AM   Result Value Ref Range    Lactic Acid 7.3 (HH) 0.5 - 2.0 mmol/L       Imaging  DX-CHEST-PORTABLE (1 VIEW)   Final Result      Right basilar opacity may represent atelectasis but pneumonitis is not excluded.         CT-CTA CHEST PULMONARY ARTERY W/ RECONS    (Results Pending)    *Personally reviewed chest x-ray and compared to prior showing low lung volumes with unchanged left lower lobe pneumonia, developing right middle lobe infiltrate, enlarged cardiac silhouette   *Personally reviewed EKG showing normal sinus rhythm with rate of 90 with an S1, Q3, inverted T3, no ST elevation, QTc interval normal at 436    Assessment/Plan  * Sepsis ([facility])  Assessment & Plan  This is Septic shock Present on admission  SIRS criteria identified on my evaluation include: Tachycardia, with heart rate greater than 90 BPM and Leukocyosis, with WBC greater than 12,000  Source is pulmonary  Presentation includes: Severe sepsis present and initial Lactate level result is >= 4 mmol/L.   Despite appropriate fluid resuscitation with crystalloid given per sepsis guidelines, the patient remains hypotensive with systolic blood pressure less than 90 or MAP less than 65  Hemodynamic support with additional fluids and IV vasopressors as needed to maintain a SBP of 90 or MAP of 65  IV antibiotics as appropriate for source of sepsis  Reassessment: I have reassessed the patient's hemodynamic status    Type 2 diabetes mellitus with hyperglycemia, without long-term current use of insulin ([facility])  Assessment & Plan  With elevated hemoglobin A1c, poorly controlled baseline diabetes  Serum beta hydroxybutyric acid, urine to assess for  ketones  Insulin drip versus DKA protocol depending on above results  Keep n.p.o. for now  Diabetes education  Continue IV fluids    Cough variant asthma- (present on admission)  Assessment & Plan  Scheduled and PRN bronchodilators  IV systemic steroids  Will need outpatient pulmonary follow-up for formal PFTs  Tobacco avoidance education provided    CAP (community acquired pneumonia)- (present on admission)  Assessment & Plan  Continue IV Rocephin and azithromycin  Follow-up on cultures  Trend procalcitonin every 48 hours  Check respiratory viral panel  CT chest to rule out underlying structural disease versus pulmonary embolism    Lactic acidosis  Assessment & Plan  Initially felt due to sepsis, now query possible DKA  Follow-up on DKA work-up  Continue IV fluids trending lactic acid levels closely    Class 3 severe obesity in adult (HCC)  Assessment & Plan  Dietary and behavioral modification to encourage weight loss  RD consultation    BPH (benign prostatic hyperplasia)  Assessment & Plan  Flomax  Monitor urine output    Leukocytosis  Assessment & Plan  Secondary to pneumonia  Monitor with therapy      Discussed patient condition and risk of morbidity and/or mortality with Hospitalist, RN, RT, Charge nurse / hot rounds and Patient.    The patient remains critically ill.  Critical care time = 48 minutes in directly providing and coordinating critical care and extensive data review.  No time overlap and excludes procedures.

## 2020-02-29 NOTE — PROGRESS NOTES
Sagrario from Lab called with critical result of a lactic acid of 4.3 at 00:40. Critical lab result read back to Sagrario.   Dr. Lees notified of critical lab result at 00:48.  Critical lab result read back by Dr. Lees.

## 2020-02-29 NOTE — PROGRESS NOTES
MD paged about elevating lactic acid levels despite fluid boluses. Repeat lactic ordered for 0200, with MD requesting updates.  CICU nurse paged bedside to assess the patient.

## 2020-02-29 NOTE — ASSESSMENT & PLAN NOTE
Failed outpt Augmentin/Doxy  Now on Rocephin/Zithro  Clinically and subjectively improved  Cultures neg  Influenza neg

## 2020-02-29 NOTE — ASSESSMENT & PLAN NOTE
This is Septic shock Present on admission  SIRS criteria identified on my evaluation include: Tachycardia, with heart rate greater than 90 BPM and Leukocyosis, with WBC greater than 12,000  Source is pulmonary  Presentation includes: Severe sepsis present and initial Lactate level result is >= 4 mmol/L.   Despite appropriate fluid resuscitation with crystalloid given per sepsis guidelines, the patient remains hypotensive with systolic blood pressure less than 90 or MAP less than 65  Hemodynamic support with additional fluids and IV vasopressors as needed to maintain a SBP of 90 or MAP of 65  IV antibiotics as appropriate for source of sepsis  Reassessment: I have reassessed the patient's hemodynamic status    Lactate 2.6- continues to trend down

## 2020-02-29 NOTE — PROGRESS NOTES
Assumed care of patient. Report received from ED RN. Patient transported to floor via zoll and placed on tele monitor and in NSR. Patient oriented times 4 and ambulatory to bed. Assessment performed and orders reviewed. Patient updated on POC and oriented to unit. Per ED RN patient had 500 cc of sepsis bolus. The remaining 3L started. Lactic 3.2. Bedside report given to Federica MCMAHON.

## 2020-02-29 NOTE — ASSESSMENT & PLAN NOTE
Patient will benefit from outpatient weight loss management program.  Body mass index is 43.58 kg/m².

## 2020-02-29 NOTE — PROGRESS NOTES
OVERNIGHT HOSPITALIST    Came to the room to evaluate patient. Lactic now 7.3 in spite of IV fluid ressussitation    Brief Hx: 46 yo M, admitted yesterday with severe sepsis/ pneumonia, Started Rocephin and Azithromycin. No complaints at this time, just feeling tired. Noticed elevated glucose of 322, anion gap 13. Patient states has h/o Diet controlled DM. Hemoglobin A1C was 8.6 yesterday. He does not take any medications at home.     Vitals: Temp: 36.5, HR: 87, RR: 18, BP: 154/82    -STAT BMP to evaluate Anion GAP, possible DKA/ New onset DM  -Serial Lactic  -Transferring to ICU, discussed with Dr. Gonda

## 2020-02-29 NOTE — PROGRESS NOTES
Tech from Lab called with critical result of lactic acid at 5.4. Critical lab result read back to tech.   Dr. Ghosh notified of critical lab result at 1335.  Critical lab result read back by Dr. Ghosh.

## 2020-02-29 NOTE — PROGRESS NOTES
Critical lactate 4.4 called from lab  Dr. Gonda notified and aware  Order for 1 L LR bolus  Will continue to round and monitor to ensure pt safety.

## 2020-02-29 NOTE — ASSESSMENT & PLAN NOTE
With elevated hemoglobin A1c, poorly controlled baseline diabetes  Serum beta hydroxybutyric acid - negative  Long acting + SSI  Diabetic diet  Diabetes education

## 2020-02-29 NOTE — ASSESSMENT & PLAN NOTE
Secondary to stress response.  Check a hemoglobin A1c however as patient's blood sugars have been elevated in the past as well.

## 2020-02-29 NOTE — ASSESSMENT & PLAN NOTE
This is Sepsis Present on admission  SIRS criteria identified on my evaluation include: Tachycardia, with heart rate greater than 90 BPM and Leukocyosis, with WBC greater than 12,000  Source is pneumonia  Sepsis protocol initiated  Fluid resuscitation ordered per protocol  IV antibiotics as appropriate for source of sepsis  While organ dysfunction may be noted elsewhere in this problem list or in the chart, degree of organ dysfunction does not meet CMS criteria for severe sepsis

## 2020-02-29 NOTE — CARE PLAN
Problem: Bronchoconstriction:  Goal: Improve in air movement and diminished wheezing  Outcome: PROGRESSING AS EXPECTED     DUONEBS Q4  ROOM AIR   IS 1750ml

## 2020-02-29 NOTE — PROGRESS NOTES
Bedside report received, patient laying in bed at this time. POC discussed with pt; all questions answered at this time. Patient assisted up to the bathroom.

## 2020-02-29 NOTE — PROGRESS NOTES
Pt arrival to RICU via cart accompanied by me and another RN.  2 RN skin check performed- skin intact  Vital signs stable  Pt has no c/o pain at this time.

## 2020-02-29 NOTE — ASSESSMENT & PLAN NOTE
Scheduled and PRN bronchodilators  IV systemic steroids; taper  Will need outpatient pulmonary follow-up for formal PFTs  Tobacco avoidance education provided

## 2020-02-29 NOTE — PROGRESS NOTES
Sagrario from Lab called with critical result of lactic of 7.3 at 02:15. Critical lab result read back to Sagrario.   Dr. Lees notified of critical lab result at 02:19.  Critical lab result read back by Dr. Lees.

## 2020-02-29 NOTE — ASSESSMENT & PLAN NOTE
Continue IV Rocephin and azithromycin  C NGTD  Follow up viral panel  CT chest no evidence of pulmonary embolism; BL atelectasis

## 2020-03-01 ENCOUNTER — APPOINTMENT (OUTPATIENT)
Dept: CARDIOLOGY | Facility: MEDICAL CENTER | Age: 45
DRG: 871 | End: 2020-03-01
Attending: INTERNAL MEDICINE
Payer: COMMERCIAL

## 2020-03-01 ENCOUNTER — APPOINTMENT (OUTPATIENT)
Dept: RADIOLOGY | Facility: MEDICAL CENTER | Age: 45
DRG: 871 | End: 2020-03-01
Attending: PSYCHIATRY & NEUROLOGY
Payer: COMMERCIAL

## 2020-03-01 LAB
ANION GAP SERPL CALC-SCNC: 10 MMOL/L (ref 0–11.9)
BASOPHILS # BLD AUTO: 0.3 % (ref 0–1.8)
BASOPHILS # BLD: 0.06 K/UL (ref 0–0.12)
BUN SERPL-MCNC: 19 MG/DL (ref 8–22)
CALCIUM SERPL-MCNC: 8.9 MG/DL (ref 8.5–10.5)
CHLORIDE SERPL-SCNC: 103 MMOL/L (ref 96–112)
CO2 SERPL-SCNC: 21 MMOL/L (ref 20–33)
CREAT SERPL-MCNC: 0.87 MG/DL (ref 0.5–1.4)
EOSINOPHIL # BLD AUTO: 0.01 K/UL (ref 0–0.51)
EOSINOPHIL NFR BLD: 0.1 % (ref 0–6.9)
ERYTHROCYTE [DISTWIDTH] IN BLOOD BY AUTOMATED COUNT: 39.1 FL (ref 35.9–50)
GLUCOSE BLD-MCNC: 213 MG/DL (ref 65–99)
GLUCOSE BLD-MCNC: 221 MG/DL (ref 65–99)
GLUCOSE BLD-MCNC: 223 MG/DL (ref 65–99)
GLUCOSE BLD-MCNC: 226 MG/DL (ref 65–99)
GLUCOSE SERPL-MCNC: 234 MG/DL (ref 65–99)
HCT VFR BLD AUTO: 45.2 % (ref 42–52)
HGB BLD-MCNC: 15.6 G/DL (ref 14–18)
IMM GRANULOCYTES # BLD AUTO: 0.43 K/UL (ref 0–0.11)
IMM GRANULOCYTES NFR BLD AUTO: 2.3 % (ref 0–0.9)
LACTATE BLD-SCNC: 2.6 MMOL/L (ref 0.5–2)
LACTATE BLD-SCNC: 2.6 MMOL/L (ref 0.5–2)
LV EJECT FRACT  99904: 65
LV EJECT FRACT MOD 2C 99903: 60.45
LV EJECT FRACT MOD 4C 99902: 61.52
LV EJECT FRACT MOD BP 99901: 61.81
LYMPHOCYTES # BLD AUTO: 2.54 K/UL (ref 1–4.8)
LYMPHOCYTES NFR BLD: 13.6 % (ref 22–41)
MAGNESIUM SERPL-MCNC: 2.3 MG/DL (ref 1.5–2.5)
MCH RBC QN AUTO: 28.2 PG (ref 27–33)
MCHC RBC AUTO-ENTMCNC: 34.5 G/DL (ref 33.7–35.3)
MCV RBC AUTO: 81.6 FL (ref 81.4–97.8)
MONOCYTES # BLD AUTO: 0.48 K/UL (ref 0–0.85)
MONOCYTES NFR BLD AUTO: 2.6 % (ref 0–13.4)
NEUTROPHILS # BLD AUTO: 15.2 K/UL (ref 1.82–7.42)
NEUTROPHILS NFR BLD: 81.1 % (ref 44–72)
NRBC # BLD AUTO: 0 K/UL
NRBC BLD-RTO: 0 /100 WBC
PHOSPHATE SERPL-MCNC: 3.4 MG/DL (ref 2.5–4.5)
PLATELET # BLD AUTO: 250 K/UL (ref 164–446)
PMV BLD AUTO: 10 FL (ref 9–12.9)
POTASSIUM SERPL-SCNC: 3.9 MMOL/L (ref 3.6–5.5)
RBC # BLD AUTO: 5.54 M/UL (ref 4.7–6.1)
SODIUM SERPL-SCNC: 134 MMOL/L (ref 135–145)
WBC # BLD AUTO: 18.7 K/UL (ref 4.8–10.8)

## 2020-03-01 PROCEDURE — 700111 HCHG RX REV CODE 636 W/ 250 OVERRIDE (IP): Performed by: PSYCHIATRY & NEUROLOGY

## 2020-03-01 PROCEDURE — 82962 GLUCOSE BLOOD TEST: CPT | Mod: 91

## 2020-03-01 PROCEDURE — 99233 SBSQ HOSP IP/OBS HIGH 50: CPT | Performed by: PSYCHIATRY & NEUROLOGY

## 2020-03-01 PROCEDURE — 700105 HCHG RX REV CODE 258: Performed by: PSYCHIATRY & NEUROLOGY

## 2020-03-01 PROCEDURE — 83605 ASSAY OF LACTIC ACID: CPT | Mod: 91

## 2020-03-01 PROCEDURE — 85025 COMPLETE CBC W/AUTO DIFF WBC: CPT

## 2020-03-01 PROCEDURE — 80048 BASIC METABOLIC PNL TOTAL CA: CPT

## 2020-03-01 PROCEDURE — 93306 TTE W/DOPPLER COMPLETE: CPT | Mod: 26 | Performed by: INTERNAL MEDICINE

## 2020-03-01 PROCEDURE — 700102 HCHG RX REV CODE 250 W/ 637 OVERRIDE(OP): Performed by: HOSPITALIST

## 2020-03-01 PROCEDURE — 93306 TTE W/DOPPLER COMPLETE: CPT

## 2020-03-01 PROCEDURE — 700111 HCHG RX REV CODE 636 W/ 250 OVERRIDE (IP): Performed by: INTERNAL MEDICINE

## 2020-03-01 PROCEDURE — 83735 ASSAY OF MAGNESIUM: CPT

## 2020-03-01 PROCEDURE — A9270 NON-COVERED ITEM OR SERVICE: HCPCS | Performed by: HOSPITALIST

## 2020-03-01 PROCEDURE — 99233 SBSQ HOSP IP/OBS HIGH 50: CPT | Performed by: HOSPITALIST

## 2020-03-01 PROCEDURE — 84100 ASSAY OF PHOSPHORUS: CPT

## 2020-03-01 PROCEDURE — 770001 HCHG ROOM/CARE - MED/SURG/GYN PRIV*

## 2020-03-01 PROCEDURE — 71045 X-RAY EXAM CHEST 1 VIEW: CPT

## 2020-03-01 RX ORDER — INSULIN GLARGINE 100 [IU]/ML
10 INJECTION, SOLUTION SUBCUTANEOUS
Status: DISCONTINUED | OUTPATIENT
Start: 2020-03-01 | End: 2020-03-02 | Stop reason: HOSPADM

## 2020-03-01 RX ADMIN — INSULIN HUMAN 6 UNITS: 100 INJECTION, SOLUTION PARENTERAL at 21:40

## 2020-03-01 RX ADMIN — ENOXAPARIN SODIUM 40 MG: 100 INJECTION SUBCUTANEOUS at 05:40

## 2020-03-01 RX ADMIN — METHYLPREDNISOLONE SODIUM SUCCINATE 40 MG: 40 INJECTION, POWDER, FOR SOLUTION INTRAMUSCULAR; INTRAVENOUS at 13:04

## 2020-03-01 RX ADMIN — INSULIN HUMAN 6 UNITS: 100 INJECTION, SOLUTION PARENTERAL at 13:06

## 2020-03-01 RX ADMIN — INSULIN HUMAN 6 UNITS: 100 INJECTION, SOLUTION PARENTERAL at 08:09

## 2020-03-01 RX ADMIN — METHYLPREDNISOLONE SODIUM SUCCINATE 40 MG: 40 INJECTION, POWDER, FOR SOLUTION INTRAMUSCULAR; INTRAVENOUS at 00:26

## 2020-03-01 RX ADMIN — AZITHROMYCIN MONOHYDRATE 500 MG: 250 TABLET ORAL at 05:40

## 2020-03-01 RX ADMIN — METHYLPREDNISOLONE SODIUM SUCCINATE 40 MG: 40 INJECTION, POWDER, FOR SOLUTION INTRAMUSCULAR; INTRAVENOUS at 17:46

## 2020-03-01 RX ADMIN — METHYLPREDNISOLONE SODIUM SUCCINATE 40 MG: 40 INJECTION, POWDER, FOR SOLUTION INTRAMUSCULAR; INTRAVENOUS at 23:53

## 2020-03-01 RX ADMIN — METHYLPREDNISOLONE SODIUM SUCCINATE 40 MG: 40 INJECTION, POWDER, FOR SOLUTION INTRAMUSCULAR; INTRAVENOUS at 05:41

## 2020-03-01 RX ADMIN — INSULIN GLARGINE 10 UNITS: 100 INJECTION, SOLUTION SUBCUTANEOUS at 13:06

## 2020-03-01 RX ADMIN — INSULIN HUMAN 6 UNITS: 100 INJECTION, SOLUTION PARENTERAL at 17:48

## 2020-03-01 RX ADMIN — CEFTRIAXONE SODIUM 2 G: 2 INJECTION, POWDER, FOR SOLUTION INTRAMUSCULAR; INTRAVENOUS at 06:55

## 2020-03-01 ASSESSMENT — ENCOUNTER SYMPTOMS
VOMITING: 0
BLURRED VISION: 0
WHEEZING: 0
PALPITATIONS: 0
SHORTNESS OF BREATH: 0
NAUSEA: 0
SHORTNESS OF BREATH: 1
LOSS OF CONSCIOUSNESS: 0
ABDOMINAL PAIN: 0
SINUS PAIN: 0
WEAKNESS: 0
EYES NEGATIVE: 1
COUGH: 1
DOUBLE VISION: 0
PSYCHIATRIC NEGATIVE: 1
FEVER: 0
SORE THROAT: 0
DIZZINESS: 0
BACK PAIN: 0
SPUTUM PRODUCTION: 1
CHILLS: 0
HEADACHES: 0
STRIDOR: 0
DIARRHEA: 0
MUSCULOSKELETAL NEGATIVE: 1

## 2020-03-01 NOTE — CARE PLAN
Problem: Communication  Goal: The ability to communicate needs accurately and effectively will improve: Patient encouraged to verbalize feelings about hospital stay. Patient is very talkative with staff and has been verbalizing needs and feelings.   Outcome: PROGRESSING SLOWER THAN EXPECTED     Problem: Safety  Goal: Will remain free from injury: Patient in hospital gown, bed alarm on and and lowest position. Patient also has the hospital socks on.  Outcome: PROGRESSING SLOWER THAN EXPECTED

## 2020-03-01 NOTE — PROGRESS NOTES
Critical Care Progress Note    Date of admission  2/28/2020    Chief Complaint  45 y.o. male admitted 2/28/2020 with Sepsis, asthma exacerbation, lactic acidosis, hyperglycemia    Hospital Course    45 y.o. male who presented 2/28/2020 with a past medical history significant for diet-controlled diabetes, asthma and recurrent pneumonia over the last 5 years who has been battling a cough with associated shortness of breath and wheezing for the last month.  He has been prescribed antibiotics (Augmentin/doxycycline) and inhaler and a Medrol Dosepak as an outpatient without any improvement.  He presented to the emergency department after having outpatient labs revealing an elevated white blood cell count as well as lactic acidosis.  He was diagnosed with sepsis and pneumonia after a chest x-ray revealed worsening infiltrates and he was given IV antibiotics and crystalloids and admitted to the hospital.  This evening patient's lactic acid level worsened up to greater than 7 and the covering hospitalist evaluated the patient and felt he needed to be transferred to the ICU.  On evaluation during our conversation it was also discovered that he has worsening hyperglycemia, anion gap metabolic acidosis and possible DKA.  Labs have been sent to further evaluate this.      Interval Problem Update  Reviewed last 24 hour events:  - No new issues overnight   - Neuro: GCS 15; Full strength throughout   - HR: 60-80s   - SBP: 130-140s   - GI: Regular diet   - UOP: 3.5L   - Hannah: No   - Tm: 37.3   - Lines: PIVs   - PPx: GI not indicated, DVT enoxaparin   - CXR (personally reviewed and compared to prior):    - WBC 18.7   - Blood Cx: NGTD   - ABx: ceftriaxone and azithromycin      Review of Systems  Review of Systems   Constitutional: Negative for chills and fever.   HENT: Negative for sinus pain and sore throat.    Eyes: Negative.    Respiratory: Positive for cough and sputum production. Negative for shortness of breath, wheezing and  stridor.    Cardiovascular: Negative for chest pain.   Gastrointestinal: Negative for abdominal pain, diarrhea, nausea and vomiting.   Genitourinary: Negative for urgency.   Musculoskeletal: Negative.    Skin: Negative for rash.   Neurological: Negative for weakness and headaches.   Endo/Heme/Allergies: Negative.    Psychiatric/Behavioral: Negative.         Vital Signs for last 24 hours   Temp:  [36.7 °C (98.1 °F)-37.3 °C (99.2 °F)] 36.8 °C (98.2 °F)  Pulse:  [] 80  Resp:  [16-53] 53  BP: (123-154)/(59-83) 136/71  SpO2:  [90 %-96 %] 96 %    Hemodynamic parameters for last 24 hours       Respiratory Information for the last 24 hours       Physical Exam   Physical Exam  Constitutional:       General: He is not in acute distress.     Appearance: Normal appearance. He is obese. He is not toxic-appearing.   HENT:      Head: Normocephalic and atraumatic.      Right Ear: External ear normal.      Left Ear: External ear normal.      Nose: Nose normal.      Mouth/Throat:      Pharynx: Oropharynx is clear.   Eyes:      Extraocular Movements: Extraocular movements intact.      Pupils: Pupils are equal, round, and reactive to light.   Neck:      Musculoskeletal: Neck supple.   Cardiovascular:      Rate and Rhythm: Normal rate and regular rhythm.      Pulses: Normal pulses.   Pulmonary:      Effort: Pulmonary effort is normal. No respiratory distress.      Breath sounds: No wheezing.   Abdominal:      General: Bowel sounds are normal.      Palpations: Abdomen is soft.   Musculoskeletal:      Right lower leg: No edema.      Left lower leg: No edema.   Skin:     General: Skin is warm and dry.      Capillary Refill: Capillary refill takes less than 2 seconds.   Neurological:      General: No focal deficit present.      Mental Status: He is alert and oriented to person, place, and time. Mental status is at baseline.      Cranial Nerves: No cranial nerve deficit.      Motor: No weakness.   Psychiatric:         Mood and Affect:  Mood normal.         Behavior: Behavior normal.         Medications  Current Facility-Administered Medications   Medication Dose Route Frequency Provider Last Rate Last Dose   • cefTRIAXone (ROCEPHIN) 2 g in  mL IVPB  2 g Intravenous Q24HRS Robin Ghosh M.D.       • methylPREDNISolone (SOLU-MEDROL) 40 MG injection 40 mg  40 mg Intravenous Q6HRS Jeremy M Gonda, M.D.   40 mg at 03/01/20 0541   • ipratropium-albuterol (DUONEB) nebulizer solution  3 mL Nebulization Q2HRS PRN (RT) Jeremy M Gonda, M.D.       • glucose 4 g chewable tablet 16 g  16 g Oral Q15 MIN PRN Jeremy M Gonda, M.D.        And   • DEXTROSE 10% BOLUS 250 mL  250 mL Intravenous Q15 MIN PRN Jeremy M Gonda, M.D.       • enoxaparin (LOVENOX) inj 40 mg  40 mg Subcutaneous DAILY Robin Ghosh M.D.   40 mg at 03/01/20 0540   • acetaminophen (TYLENOL) tablet 650 mg  650 mg Oral Q6HRS PRN Anjana Carr M.D.       • labetalol (NORMODYNE/TRANDATE) injection 10 mg  10 mg Intravenous Q4HRS PRN Anjana Carr M.D.   10 mg at 02/29/20 0203   • senna-docusate (PERICOLACE or SENOKOT S) 8.6-50 MG per tablet 2 Tab  2 Tab Oral BID Anjana Carr M.D.   Stopped at 02/29/20 0600    And   • polyethylene glycol/lytes (MIRALAX) PACKET 1 Packet  1 Packet Oral QDAY PRN Anjana Carr M.D.        And   • magnesium hydroxide (MILK OF MAGNESIA) suspension 30 mL  30 mL Oral QDAY PRN Anjana Carr M.D.        And   • bisacodyl (DULCOLAX) suppository 10 mg  10 mg Rectal QDAY PRN Anjana Carr M.D.       • lactated ringers infusion (BOLUS)  500 mL Intravenous Once PRN Anjana Carr M.D.       • ondansetron (ZOFRAN) syringe/vial injection 4 mg  4 mg Intravenous Q4HRS PRN Levente Levai, M.D.       • ondansetron (ZOFRAN ODT) dispertab 4 mg  4 mg Oral Q4HRS PRN Anjana Carr M.D.       • promethazine (PHENERGAN) tablet 12.5-25 mg  12.5-25 mg Oral Q4HRS PRN Anjana Carr M.D.       • promethazine (PHENERGAN) suppository 12.5-25 mg  12.5-25 mg Rectal Q4HRS PRN Anjana Carr  M.D.       • prochlorperazine (COMPAZINE) injection 5-10 mg  5-10 mg Intravenous Q4HRS PRBECKA Carr M.D.       • guaiFENesin dextromethorphan (ROBITUSSIN DM) 100-10 MG/5ML syrup 10 mL  10 mL Oral Q6HRS PRBECKA Carr M.D.   10 mL at 02/28/20 1827   • insulin regular (HUMULIN R) injection 3-18 Units  3-18 Units Subcutaneous 4X/DAY ACHCANELO Carr M.D.   9 Units at 02/29/20 2058       Fluids    Intake/Output Summary (Last 24 hours) at 3/1/2020 0628  Last data filed at 3/1/2020 0600  Gross per 24 hour   Intake 3500 ml   Output 3500 ml   Net 0 ml       Laboratory          Recent Labs     02/28/20  1504 02/29/20  0020 02/29/20  0317 02/29/20  0357 02/29/20  0816   SODIUM 139 137 138  --   --    POTASSIUM 3.6 4.1 3.8  --   --    CHLORIDE 103 104 104  --   --    CO2 24 20 17*  --   --    BUN 19 23* 22  --   --    CREATININE 1.13 1.15 1.12  --   --    MAGNESIUM 2.0  --   --  1.7 1.8   PHOSPHORUS  --   --   --  3.1 2.6   CALCIUM 9.0 8.4* 8.2*  --   --      Recent Labs     02/28/20  1133 02/28/20  1504 02/29/20  0020 02/29/20  0317   ALTSGPT 29 28  --   --    ASTSGOT 16 19  --   --    ALKPHOSPHAT 72 67  --   --    TBILIRUBIN 0.8 0.7  --   --    GLUCOSE 153* 226* 322* 275*     Recent Labs     02/28/20  1133 02/28/20  1504 02/29/20  0020 02/29/20  0357 03/01/20  0559   WBC 18.2* 17.0* 15.5* 16.2* 18.7*   NEUTSPOLYS 72.10* 68.50  --  77.20* 81.10*   LYMPHOCYTES 20.40* 24.20  --  16.60* 13.60*   MONOCYTES 5.20 4.90  --  4.20 2.60   EOSINOPHILS 0.20 0.30  --  0.00 0.10   BASOPHILS 0.50 0.50  --  0.30 0.30   ASTSGOT 16 19  --   --   --    ALTSGPT 29 28  --   --   --    ALKPHOSPHAT 72 67  --   --   --    TBILIRUBIN 0.8 0.7  --   --   --      Recent Labs     02/28/20  1504 02/29/20  0020 02/29/20  0357 03/01/20  0559   RBC 5.50 5.00 4.87 5.54   HEMOGLOBIN 15.2 14.0 13.5* 15.6   HEMATOCRIT 44.6 41.2* 40.0* 45.2   PLATELETCT 252 213 240 250   PROTHROMBTM 13.1  --   --   --    INR 0.98  --   --   --        Imaging  X-Ray:   I have personally reviewed the images and compared with prior images.    Assessment/Plan  * Sepsis ([facility])  Assessment & Plan  This is Septic shock Present on admission  SIRS criteria identified on my evaluation include: Tachycardia, with heart rate greater than 90 BPM and Leukocyosis, with WBC greater than 12,000  Source is pulmonary  Presentation includes: Severe sepsis present and initial Lactate level result is >= 4 mmol/L.   Despite appropriate fluid resuscitation with crystalloid given per sepsis guidelines, the patient remains hypotensive with systolic blood pressure less than 90 or MAP less than 65  Hemodynamic support with additional fluids and IV vasopressors as needed to maintain a SBP of 90 or MAP of 65  IV antibiotics as appropriate for source of sepsis  Reassessment: I have reassessed the patient's hemodynamic status    Lactate 2.6- continues to trend down    Type 2 diabetes mellitus with hyperglycemia, without long-term current use of insulin ([facility])  Assessment & Plan  With elevated hemoglobin A1c, poorly controlled baseline diabetes  Serum beta hydroxybutyric acid - negative  Long acting + SSI  Diabetic diet  Diabetes education      Cough variant asthma- (present on admission)  Assessment & Plan  Scheduled and PRN bronchodilators  IV systemic steroids; taper  Will need outpatient pulmonary follow-up for formal PFTs  Tobacco avoidance education provided    CAP (community acquired pneumonia)- (present on admission)  Assessment & Plan  Continue IV Rocephin and azithromycin  C NGTD  Follow up viral panel  CT chest no evidence of pulmonary embolism; BL atelectasis    Lactic acidosis  Assessment & Plan  Trending down    Class 3 severe obesity in adult ([facility])  Assessment & Plan  Dietary and behavioral modification to encourage weight loss  RD consultation    BPH (benign prostatic hyperplasia)  Assessment & Plan  Flomax  Monitor urine output    Leukocytosis  Assessment & Plan  Secondary to pneumonia  Monitor  with therapy       VTE:  Lovenox  Ulcer: Not Indicated  Lines: None    I have performed a physical exam and reviewed and updated ROS and Plan today (3/1/2020). In review of yesterday's note (2/29/2020), there are no changes except as documented above.     Discussed patient condition and risk of morbidity and/or mortality with Hospitalist, Family, RN, RT, Pharmacy, Code status disscussed, Charge nurse / hot rounds and Patient

## 2020-03-01 NOTE — ASSESSMENT & PLAN NOTE
A1c 8.2 on this admission  On no meds as an outpt  Cont SSI  Add lantus while on steroids  Diabetic diet

## 2020-03-01 NOTE — PROGRESS NOTES
[facility] Medicine Daily Progress Note    Date of Service  3/1/2020    Chief Complaint  45 y.o. male admitted 2/28/2020 with SOB    Hospital Course    46yo PMHx of BPH, DM, asthma.  Had been having SOB and productive cough as an outpt started on Augmentin/Doxy and a medrol dosepak.  Came to the ED as he was not getting better.  Admitted to the ICU with Dx of sepsis, CAP, LA 7 and borderline DKA.          Interval Problem Update  Pt states he's feeling better.  deinitely less SOB though cough is still quite bothersome.  No F or C    Consultants/Specialty      Code Status  full    Disposition  OK to floor    Review of Systems  Review of Systems   Constitutional: Negative for chills and fever.   HENT: Negative for nosebleeds and sore throat.    Eyes: Negative for blurred vision and double vision.   Respiratory: Positive for cough, sputum production and shortness of breath.    Cardiovascular: Negative for chest pain, palpitations and leg swelling.   Gastrointestinal: Negative for abdominal pain, diarrhea, nausea and vomiting.   Genitourinary: Negative for dysuria and urgency.   Musculoskeletal: Negative for back pain.   Skin: Negative for rash.   Neurological: Negative for dizziness, loss of consciousness and headaches.        Physical Exam  Temp:  [36.7 °C (98.1 °F)-37.3 °C (99.2 °F)] 36.7 °C (98.1 °F)  Pulse:  [68-94] 90  Resp:  [16-20] 18  BP: (124-153)/(59-83) 124/63  SpO2:  [90 %-96 %] 93 %    Physical Exam  Vitals signs reviewed.   Constitutional:       General: He is not in acute distress.     Appearance: He is well-developed. He is not diaphoretic.   HENT:      Head: Normocephalic and atraumatic.   Eyes:      Conjunctiva/sclera: Conjunctivae normal.   Neck:      Vascular: No JVD.   Cardiovascular:      Rate and Rhythm: Normal rate.      Heart sounds: No murmur. No gallop.    Pulmonary:      Effort: Pulmonary effort is normal. No respiratory distress.      Breath sounds: No stridor. Wheezing present. No rales.    Abdominal:      Palpations: Abdomen is soft.      Tenderness: There is no abdominal tenderness. There is no guarding or rebound.   Musculoskeletal:         General: No tenderness.      Right lower leg: No edema.      Left lower leg: No edema.   Skin:     General: Skin is warm and dry.      Findings: No rash.   Neurological:      Mental Status: He is oriented to person, place, and time.   Psychiatric:         Thought Content: Thought content normal.         Fluids    Intake/Output Summary (Last 24 hours) at 3/1/2020 1032  Last data filed at 3/1/2020 0800  Gross per 24 hour   Intake 3740 ml   Output 3300 ml   Net 440 ml       Laboratory  Recent Labs     02/29/20  0020 02/29/20  0357 03/01/20  0559   WBC 15.5* 16.2* 18.7*   RBC 5.00 4.87 5.54   HEMOGLOBIN 14.0 13.5* 15.6   HEMATOCRIT 41.2* 40.0* 45.2   MCV 82.4 82.1 81.6   MCH 28.0 27.7 28.2   MCHC 34.0 33.8 34.5   RDW 39.8 39.7 39.1   PLATELETCT 213 240 250   MPV 9.9 10.3 10.0     Recent Labs     02/29/20  0020 02/29/20  0317 03/01/20  0559   SODIUM 137 138 134*   POTASSIUM 4.1 3.8 3.9   CHLORIDE 104 104 103   CO2 20 17* 21   GLUCOSE 322* 275* 234*   BUN 23* 22 19   CREATININE 1.15 1.12 0.87   CALCIUM 8.4* 8.2* 8.9     Recent Labs     02/28/20  1504   INR 0.98         Recent Labs     02/29/20  0020   TRIGLYCERIDE 79   HDL 38*   LDL 78       Imaging  CT-CTA CHEST PULMONARY ARTERY W/ RECONS   Final Result      1.  No evidence of pulmonary emboli.   2.  Minimal bilateral lower lobe discoid atelectasis.   3.  Mild splenomegaly.            DX-CHEST-PORTABLE (1 VIEW)   Final Result      Right basilar opacity may represent atelectasis but pneumonitis is not excluded.         EC-ECHOCARDIOGRAM COMPLETE W/O CONT    (Results Pending)   DX-CHEST-LIMITED (1 VIEW)    (Results Pending)        Assessment/Plan  * Sepsis (HCC)  Assessment & Plan  This is Sepsis Present on admission  SIRS criteria identified on my evaluation include: Tachycardia, with heart rate greater than 90 BPM  and Leukocyosis, with WBC greater than 12,000  Source is pneumonia  Sepsis protocol initiated  Fluid resuscitation ordered per protocol  IV antibiotics as appropriate for source of sepsis  While organ dysfunction may be noted elsewhere in this problem list or in the chart, degree of organ dysfunction does not meet CMS criteria for severe sepsis          Type 2 diabetes mellitus with hyperglycemia, without long-term current use of insulin ([facility])  Assessment & Plan  A1c 8.2 on this admission  On no meds as an outpt  Cont SSI  Add lantus while on steroids  Diabetic diet    Cough variant asthma- (present on admission)  Assessment & Plan  Cont IV steroids  O2/RT protocols    CAP (community acquired pneumonia)- (present on admission)  Assessment & Plan  Failed outpt Augmentin/Doxy  Now on Rocephin/Zithro  Clinically and subjectively improved  Cultures neg  Influenza neg    Lactic acidosis  Assessment & Plan  Have normalized    Class 3 severe obesity in adult (HCC)  Assessment & Plan  Patient will benefit from outpatient weight loss management program.  Body mass index is 43.58 kg/m².        BPH (benign prostatic hyperplasia)  Assessment & Plan  Patient in the past has had problems with his prostate, start Flomax    Leukocytosis  Assessment & Plan  Secondary to the pneumonia and now probably some demargination from steroids       VTE prophylaxis: enoxaparin

## 2020-03-02 VITALS
BODY MASS INDEX: 43.56 KG/M2 | DIASTOLIC BLOOD PRESSURE: 98 MMHG | OXYGEN SATURATION: 97 % | HEIGHT: 65 IN | SYSTOLIC BLOOD PRESSURE: 148 MMHG | TEMPERATURE: 97.9 F | HEART RATE: 79 BPM | RESPIRATION RATE: 16 BRPM | WEIGHT: 261.47 LBS

## 2020-03-02 LAB
ANION GAP SERPL CALC-SCNC: 10 MMOL/L (ref 0–11.9)
BASOPHILS # BLD AUTO: 0.3 % (ref 0–1.8)
BASOPHILS # BLD: 0.08 K/UL (ref 0–0.12)
BUN SERPL-MCNC: 29 MG/DL (ref 8–22)
CALCIUM SERPL-MCNC: 8.7 MG/DL (ref 8.5–10.5)
CHLORIDE SERPL-SCNC: 101 MMOL/L (ref 96–112)
CO2 SERPL-SCNC: 23 MMOL/L (ref 20–33)
CREAT SERPL-MCNC: 1.08 MG/DL (ref 0.5–1.4)
EOSINOPHIL # BLD AUTO: 0 K/UL (ref 0–0.51)
EOSINOPHIL NFR BLD: 0 % (ref 0–6.9)
ERYTHROCYTE [DISTWIDTH] IN BLOOD BY AUTOMATED COUNT: 40.6 FL (ref 35.9–50)
GLUCOSE BLD-MCNC: 232 MG/DL (ref 65–99)
GLUCOSE SERPL-MCNC: 255 MG/DL (ref 65–99)
HCT VFR BLD AUTO: 48.1 % (ref 42–52)
HGB BLD-MCNC: 15.8 G/DL (ref 14–18)
IMM GRANULOCYTES # BLD AUTO: 0.88 K/UL (ref 0–0.11)
IMM GRANULOCYTES NFR BLD AUTO: 3.8 % (ref 0–0.9)
LACTATE BLD-SCNC: 1.7 MMOL/L (ref 0.5–2)
LACTATE BLD-SCNC: 2.2 MMOL/L (ref 0.5–2)
LYMPHOCYTES # BLD AUTO: 2.34 K/UL (ref 1–4.8)
LYMPHOCYTES NFR BLD: 10 % (ref 22–41)
MAGNESIUM SERPL-MCNC: 2.3 MG/DL (ref 1.5–2.5)
MCH RBC QN AUTO: 27.4 PG (ref 27–33)
MCHC RBC AUTO-ENTMCNC: 32.8 G/DL (ref 33.7–35.3)
MCV RBC AUTO: 83.5 FL (ref 81.4–97.8)
MONOCYTES # BLD AUTO: 0.8 K/UL (ref 0–0.85)
MONOCYTES NFR BLD AUTO: 3.4 % (ref 0–13.4)
NEUTROPHILS # BLD AUTO: 19.26 K/UL (ref 1.82–7.42)
NEUTROPHILS NFR BLD: 82.5 % (ref 44–72)
NRBC # BLD AUTO: 0 K/UL
NRBC BLD-RTO: 0 /100 WBC
PHOSPHATE SERPL-MCNC: 3.6 MG/DL (ref 2.5–4.5)
PLATELET # BLD AUTO: 313 K/UL (ref 164–446)
PMV BLD AUTO: 9.9 FL (ref 9–12.9)
POTASSIUM SERPL-SCNC: 4 MMOL/L (ref 3.6–5.5)
RBC # BLD AUTO: 5.76 M/UL (ref 4.7–6.1)
SODIUM SERPL-SCNC: 134 MMOL/L (ref 135–145)
WBC # BLD AUTO: 23.4 K/UL (ref 4.8–10.8)

## 2020-03-02 PROCEDURE — 700111 HCHG RX REV CODE 636 W/ 250 OVERRIDE (IP): Performed by: INTERNAL MEDICINE

## 2020-03-02 PROCEDURE — 84100 ASSAY OF PHOSPHORUS: CPT

## 2020-03-02 PROCEDURE — 82962 GLUCOSE BLOOD TEST: CPT

## 2020-03-02 PROCEDURE — 85025 COMPLETE CBC W/AUTO DIFF WBC: CPT

## 2020-03-02 PROCEDURE — 700111 HCHG RX REV CODE 636 W/ 250 OVERRIDE (IP): Performed by: PSYCHIATRY & NEUROLOGY

## 2020-03-02 PROCEDURE — 99239 HOSP IP/OBS DSCHRG MGMT >30: CPT | Performed by: HOSPITALIST

## 2020-03-02 PROCEDURE — 83605 ASSAY OF LACTIC ACID: CPT

## 2020-03-02 PROCEDURE — 80048 BASIC METABOLIC PNL TOTAL CA: CPT

## 2020-03-02 PROCEDURE — 700105 HCHG RX REV CODE 258: Performed by: PSYCHIATRY & NEUROLOGY

## 2020-03-02 PROCEDURE — 83735 ASSAY OF MAGNESIUM: CPT

## 2020-03-02 PROCEDURE — 700102 HCHG RX REV CODE 250 W/ 637 OVERRIDE(OP): Performed by: HOSPITALIST

## 2020-03-02 RX ORDER — ALBUTEROL SULFATE 90 UG/1
2 AEROSOL, METERED RESPIRATORY (INHALATION) EVERY 6 HOURS PRN
Qty: 1 INHALER | Refills: 10 | Status: SHIPPED | OUTPATIENT
Start: 2020-03-02 | End: 2023-06-14

## 2020-03-02 RX ORDER — CEFDINIR 300 MG/1
300 CAPSULE ORAL 2 TIMES DAILY
Qty: 8 CAP | Refills: 0 | Status: SHIPPED | OUTPATIENT
Start: 2020-03-02 | End: 2020-04-06

## 2020-03-02 RX ORDER — PREDNISONE 20 MG/1
20 TABLET ORAL DAILY
Qty: 30 TAB | Refills: 0 | Status: SHIPPED | OUTPATIENT
Start: 2020-03-02 | End: 2020-04-06

## 2020-03-02 RX ADMIN — CEFTRIAXONE SODIUM 2 G: 2 INJECTION, POWDER, FOR SOLUTION INTRAMUSCULAR; INTRAVENOUS at 04:35

## 2020-03-02 RX ADMIN — ENOXAPARIN SODIUM 40 MG: 100 INJECTION SUBCUTANEOUS at 04:34

## 2020-03-02 RX ADMIN — INSULIN HUMAN 6 UNITS: 100 INJECTION, SOLUTION PARENTERAL at 08:41

## 2020-03-02 RX ADMIN — INSULIN GLARGINE 10 UNITS: 100 INJECTION, SOLUTION SUBCUTANEOUS at 08:41

## 2020-03-02 RX ADMIN — METHYLPREDNISOLONE SODIUM SUCCINATE 40 MG: 40 INJECTION, POWDER, FOR SOLUTION INTRAMUSCULAR; INTRAVENOUS at 04:40

## 2020-03-02 NOTE — DISCHARGE SUMMARY
Discharge Summary    CHIEF COMPLAINT ON ADMISSION  Chief Complaint   Patient presents with   • Shortness of Breath   • Cough       Reason for Admission  Sent by MD     Admission Date  2/28/2020    CODE STATUS  Full Code    HPI & HOSPITAL COURSE  This is a 45 y.o. male who has a previous medical history significant for asthma, and diet-controlled diabetes.  The patient also sees a natural path for his diabetes.  His last A1c was 8.  He presented to the emergency room for evaluation of shortness of breath and productive cough.  He had been seen as an outpatient at an urgent care and started on Augmentin and doxycycline.  He was also given a short course of Medrol Dosepak.  Despite these interventions he continued to worsen.    In the ED the patient was found to be hypoxic and had a lactic acid greater than 4, he was therefore admitted to the ICU.  In the unit the patient did quite well.  His lactic acid rapidly improved, and his respiratory status also improved.  His cultures were eventually negative.  On the day of discharge he is back on room air and able to ambulate the length of the unit with no desaturations.  His cough is markedly improved, the remainder his vital signs are benign.    The patient was admitted and treated with Rocephin, we will send him home on Omnicef.  We will also have him on a very brief course of oral prednisone.  On the day of discharge we had a long discussion regarding hyperglycemia steroids insulin as well as issues related to pneumonia etc. he is quite eager to go home and feeling well  44yo PMHx of BPH, DM, asthma.  Had been having SOB and productive cough as an outpt started on Augmentin/Doxy and a medrol dosepak.  Came to the ED as he was not getting better.  Admitted to the ICU with Dx of sepsis, CAP, LA 7 and borderline DKA.         Therefore, he is discharged in good and stable condition to home with close outpatient follow-up.    The patient met 2-midnight criteria for an  inpatient stay at the time of discharge.    Discharge Date  3/2/2020    FOLLOW UP ITEMS POST DISCHARGE  With PCP    DISCHARGE DIAGNOSES  Principal Problem:    Sepsis ([facility]) POA: Unknown  Active Problems:    CAP (community acquired pneumonia) POA: Yes    Cough variant asthma POA: Yes    Type 2 diabetes mellitus with hyperglycemia, without long-term current use of insulin ([facility]) POA: Unknown    Lactic acidosis POA: Unknown    Leukocytosis POA: Unknown    BPH (benign prostatic hyperplasia) POA: Unknown    Class 3 severe obesity in adult ([facility]) POA: Unknown  Resolved Problems:    * No resolved hospital problems. *      FOLLOW UP  Future Appointments   Date Time Provider Department Center   4/10/2020  9:40 AM SCOTT Lomax     No follow-up provider specified.    MEDICATIONS ON DISCHARGE     Medication List      START taking these medications      Instructions   albuterol 108 (90 Base) MCG/ACT Aers inhalation aerosol   Inhale 2 Puffs by mouth every 6 hours as needed for Shortness of Breath.  Dose:  2 Puff     cefdinir 300 MG Caps  Commonly known as:  OMNICEF   Take 1 Cap by mouth 2 times a day.  Dose:  300 mg     predniSONE 20 MG Tabs  Commonly known as:  DELTASONE   Doctor's comments:  Take one dose tomorrow 3/3/20  Take 1 Tab by mouth every day.  Dose:  20 mg        CONTINUE taking these medications      Instructions   benzonatate 100 MG Caps  Commonly known as:  TESSALON   Take 100 mg by mouth 3 times a day as needed for Cough.  Dose:  100 mg        STOP taking these medications    amoxicillin-clavulanate 875-125 MG Tabs  Commonly known as:  AUGMENTIN     doxycycline 100 MG Tabs  Commonly known as:  VIBRAMYCIN     methylPREDNISolone 4 MG Tbpk  Commonly known as:  MEDROL DOSEPAK            Allergies  No Known Allergies    DIET  Orders Placed This Encounter   Procedures   • Diet Order Diabetic     Standing Status:   Standing     Number of Occurrences:   1     Order Specific Question:   Diet:      Answer:   Diabetic [3]       ACTIVITY  As tolerated.  Weight bearing as tolerated    CONSULTATIONS  Critical care medicine    PROCEDURES  None    LABORATORY  Lab Results   Component Value Date    SODIUM 134 (L) 03/02/2020    POTASSIUM 4.0 03/02/2020    CHLORIDE 101 03/02/2020    CO2 23 03/02/2020    GLUCOSE 255 (H) 03/02/2020    BUN 29 (H) 03/02/2020    CREATININE 1.08 03/02/2020        Lab Results   Component Value Date    WBC 23.4 (H) 03/02/2020    HEMOGLOBIN 15.8 03/02/2020    HEMATOCRIT 48.1 03/02/2020    PLATELETCT 313 03/02/2020        Total time of the discharge process exceeds 35 minutes.  I spent the majority of that time with the patient on the day of discharge reviewing discharge instructions and issues related to his diabetes and steroids in addition to asthma and pneumonia.  On the day of discharge as noted above he is feeling much better and eager to go home.

## 2020-03-02 NOTE — PROGRESS NOTES
Assumed care for the pt; bedside shift report received; call bell within reach; side rails up x2; pt is resting at this time with eyes open; no resp distress noted; no c/o pain; plan of care reviewed.

## 2020-03-02 NOTE — CARE PLAN
Problem: Communication  Goal: The ability to communicate needs accurately and effectively will improve  Outcome: PROGRESSING AS EXPECTED  Note: Pt is able to make needs known; call bell within reach.      Problem: Safety  Goal: Will remain free from injury  Outcome: PROGRESSING AS EXPECTED  Note: Up as tolerated; gait steady; call bell within reach; side rails up x2.

## 2020-03-02 NOTE — PROGRESS NOTES
[facility] Medicine Daily Progress Note    Date of Service  3/2/2020    Chief Complaint  45 y.o. male admitted 2/28/2020 with ***    Hospital Course    ***      Interval Problem Update  ***    Consultants/Specialty  ***    Code Status  ***    Disposition  ***    Review of Systems  ROS     Physical Exam  Temp:  [36.4 °C (97.5 °F)-36.8 °C (98.3 °F)] 36.6 °C (97.9 °F)  Pulse:  [77-94] 77  Resp:  [16-20] 20  BP: (124-145)/(63-92) 144/92  SpO2:  [93 %-97 %] 97 %    Physical Exam    Fluids    Intake/Output Summary (Last 24 hours) at 3/2/2020 0542  Last data filed at 3/1/2020 2030  Gross per 24 hour   Intake 1240 ml   Output 550 ml   Net 690 ml       Laboratory  Recent Labs     02/29/20  0357 03/01/20  0559 03/02/20  0445   WBC 16.2* 18.7* 23.4*   RBC 4.87 5.54 5.76   HEMOGLOBIN 13.5* 15.6 15.8   HEMATOCRIT 40.0* 45.2 48.1   MCV 82.1 81.6 83.5   MCH 27.7 28.2 27.4   MCHC 33.8 34.5 32.8*   RDW 39.7 39.1 40.6   PLATELETCT 240 250 313   MPV 10.3 10.0 9.9     Recent Labs     02/29/20  0317 03/01/20  0559 03/02/20  0445   SODIUM 138 134* 134*   POTASSIUM 3.8 3.9 4.0   CHLORIDE 104 103 101   CO2 17* 21 23   GLUCOSE 275* 234* 255*   BUN 22 19 29*   CREATININE 1.12 0.87 1.08   CALCIUM 8.2* 8.9 8.7     Recent Labs     02/28/20  1504   INR 0.98         Recent Labs     02/29/20  0020   TRIGLYCERIDE 79   HDL 38*   LDL 78       Imaging  {Wetread or Wildcard:[id number]}     Assessment/Plan  * Sepsis (HCC)  Assessment & Plan  This is Sepsis Present on admission  SIRS criteria identified on my evaluation include: Tachycardia, with heart rate greater than 90 BPM and Leukocyosis, with WBC greater than 12,000  Source is pneumonia  Sepsis protocol initiated  Fluid resuscitation ordered per protocol  IV antibiotics as appropriate for source of sepsis  While organ dysfunction may be noted elsewhere in this problem list or in the chart, degree of organ dysfunction does not meet CMS criteria for severe sepsis          Type 2 diabetes mellitus with  hyperglycemia, without long-term current use of insulin ([facility])  Assessment & Plan  A1c 8.2 on this admission  On no meds as an outpt  Cont SSI  Add lantus while on steroids  Diabetic diet    Cough variant asthma- (present on admission)  Assessment & Plan  Cont IV steroids  O2/RT protocols    CAP (community acquired pneumonia)- (present on admission)  Assessment & Plan  Failed outpt Augmentin/Doxy  Now on Rocephin/Zithro  Clinically and subjectively improved  Cultures neg  Influenza neg    Lactic acidosis  Assessment & Plan  Have normalized    Class 3 severe obesity in adult ([facility])  Assessment & Plan  Patient will benefit from outpatient weight loss management program.  Body mass index is 43.58 kg/m².        BPH (benign prostatic hyperplasia)  Assessment & Plan  Patient in the past has had problems with his prostate, start Flomax    Leukocytosis  Assessment & Plan  Secondary to the pneumonia and now probably some demargination from steroids         VTE prophylaxis: ***

## 2020-03-02 NOTE — DISCHARGE INSTRUCTIONS
Discharge Instructions    Discharged to home by car with friend. Discharged via walking, hospital escort: Refused.  Special equipment needed: Not Applicable    Be sure to schedule a follow-up appointment with your primary care doctor or any specialists as instructed.     Discharge Plan:   Influenza Vaccine Indication: Patient Refuses    I understand that a diet low in cholesterol, fat, and sodium is recommended for good health. Unless I have been given specific instructions below for another diet, I accept this instruction as my diet prescription.   Other diet: diabetic    Special Instructions: Sepsis, Adult  Sepsis is a serious infection of your blood or tissues that affects your whole body. The infection that causes sepsis may be bacterial, viral, fungal, or parasitic. Sepsis may be life threatening. Sepsis can cause your blood pressure to drop. This may result in shock. Shock causes your central nervous system and your organs to stop working correctly.  What increases the risk?  Sepsis can happen in anyone, but it is more likely to happen in people who have weakened immune systems.  What are the signs or symptoms?  Symptoms of sepsis can include:  · Fever or low body temperature (hypothermia).  · Rapid breathing (hyperventilation).  · Chills.  · Rapid heartbeat (tachycardia).  · Confusion or light-headedness.  · Trouble breathing.  · Urinating much less than usual.  · Cool, clammy skin or red, flushed skin.  · Other problems with the heart, kidneys, or brain.  How is this diagnosed?  Your health care provider will likely do tests to look for an infection, to see if the infection has spread to your blood, and to see how serious your condition is. Tests can include:  · Blood tests, including cultures of your blood.  · Cultures of other fluids from your body, such as:  ¨ Urine.  ¨ Pus from wounds.  ¨ Mucus coughed up from your lungs.  · Urine tests other than cultures.  · X-ray exams or other imaging tests.  How is  this treated?  Treatment will begin with elimination of the source of infection. If your sepsis is likely caused by a bacterial or fungal infection, you will be given antibiotic or antifungal medicines.  You may also receive:  · Oxygen.  · Fluids through an IV tube.  · Medicines to increase your blood pressure.  · A machine to clean your blood (dialysis) if your kidneys fail.  · A machine to help you breathe if your lungs fail.  Get help right away if:  You get an infection or develop any of the signs and symptoms of sepsis after surgery or a hospitalization.  This information is not intended to replace advice given to you by your health care provider. Make sure you discuss any questions you have with your health care provider.  Document Released: 09/15/2004 Document Revised: 05/25/2017 Document Reviewed: 08/25/2014  [employer] Interactive Patient Education © 2017 [employer]      · Is patient discharged on Warfarin / Coumadin?   No     Depression / Suicide Risk    As you are discharged from this [facility] facility, it is important to learn how to keep safe from harming yourself.    Recognize the warning signs:  · Abrupt changes in personality, positive or negative- including increase in energy   · Giving away possessions  · Change in eating patterns- significant weight changes-  positive or negative  · Change in sleeping patterns- unable to sleep or sleeping all the time   · Unwillingness or inability to communicate  · Depression  · Unusual sadness, discouragement and loneliness  · Talk of wanting to die  · Neglect of personal appearance   · Rebelliousness- reckless behavior  · Withdrawal from people/activities they love  · Confusion- inability to concentrate     If you or a loved one observes any of these behaviors or has concerns about self-harm, here's what you can do:  · Talk about it- your feelings and reasons for harming yourself  · Remove any means that you might use to hurt yourself (examples: pills,  rope, extension cords, firearm)  · Get professional help from the community (Mental Health, Substance Abuse, psychological counseling)  · Do not be alone:Call your Safe Contact- someone whom you trust who will be there for you.  · Call your local CRISIS HOTLINE 580-7639 or 919-757-8346  · Call your local Children's Mobile Crisis Response Team [place] (804) 418-4608 or www.[employer]  · Call the toll free National Suicide Prevention Hotlines   · National Suicide Prevention Lifeline 337-994-LODX (9822)  · National Hope Line Network 800-SUICIDE (077-7802)

## 2020-03-02 NOTE — CARE PLAN
Problem: Communication  Goal: The ability to communicate needs accurately and effectively will improve  Outcome: MET     Problem: Safety  Goal: Will remain free from injury  Outcome: MET  Goal: Will remain free from falls  Outcome: MET     Problem: Infection  Goal: Will remain free from infection  Outcome: MET     Problem: Venous Thromboembolism (VTW)/Deep Vein Thrombosis (DVT) Prevention:  Goal: Patient will participate in Venous Thrombosis (VTE)/Deep Vein Thrombosis (DVT)Prevention Measures  Outcome: MET     Problem: Bowel/Gastric:  Goal: Normal bowel function is maintained or improved  Outcome: MET     Problem: Knowledge Deficit  Goal: Knowledge of disease process/condition, treatment plan, diagnostic tests, and medications will improve  Outcome: MET  Goal: Knowledge of the prescribed therapeutic regimen will improve  Outcome: MET     Problem: Discharge Barriers/Planning  Goal: Patient's continuum of care needs will be met  Outcome: MET    Discharge packet given to patient and answered all questions. Patient understands new medications and disease process. Social work provided note for [employer] requested by patient.      Problem: Respiratory:  Goal: Respiratory status will improve  Outcome: MET     Problem: Fluid Volume:  Goal: Will maintain balanced intake and output  Outcome: MET

## 2020-03-02 NOTE — PROGRESS NOTES
Patient discharged by this RN. Discharge paperwork completed and follow-up appointment in place. All questions answered, and belongings taken with patient. Social work/MD notes with patient.

## 2020-03-04 LAB
BACTERIA BLD CULT: NORMAL
BACTERIA BLD CULT: NORMAL
SIGNIFICANT IND 70042: NORMAL
SIGNIFICANT IND 70042: NORMAL
SITE SITE: NORMAL
SITE SITE: NORMAL
SOURCE SOURCE: NORMAL
SOURCE SOURCE: NORMAL

## 2020-03-07 NOTE — DOCUMENTATION QUERY
[facility]                                                                       Query Response Note      PATIENT:               ROSANNA PHILLIP  ACCT #:                  [id number]  MRN:                     [id number]  :                      1975  ADMIT DATE:       2020 1:51 PM  DISCH DATE:        3/2/2020 11:26 AM  RESPONDING  PROVIDER #:        [id number]           QUERY TEXT:    Septic shock is documented in the Pulmonary Progress Notes. Per subsequent Hospital Medicine Progress Notes; septic shock is no longer being documented. Can the status of this condition be further clarified?     NOTE:  If an appropriate response is not listed below, please respond with a new note.    The patient's Clinical Indicators include:  Per Pulmonary Consultation  -Presentation includes: Severe sepsis present and initial Lactate level result is >= 4 mmol/L.   -Despite appropriate fluid resuscitation with crystalloid given per sepsis guidelines,  - the patient remains hypotensive with systolic blood pressure less than 90 or MAP less than 65     Vital Sign Review:  BP Min 107/59, Max 154/67  RR Min 16, Max 88  P Min 72- Max 104    Treatment:   Sepsis Protocol, IV LR 30ml/kg bolus, additional LR & NS 500ml bolus, continuous LR at 125ml/hr, Rocephin, IV doxycycline, PO Zithromax, IV Solu-Medrol, DuoNebs, & Humulin R sliding scale insulin     Risk Factors:   Sepsis, pneumonia, DKA, lactic acidosis, & morbid obesity  Options provided:   -- Sepsis with hypotension without septic shock   -- Septic shock ruled in   -- Unable to determine      Query created by: Clari Carrera on 3/2/2020 2:43 PM    RESPONSE TEXT:    Sepsis with hypotension without septic shock          Electronically signed by:  JONH REARDON DO 3/7/2020 3:35 PM

## 2020-03-18 ENCOUNTER — TELEPHONE (OUTPATIENT)
Dept: HEALTH INFORMATION MANAGEMENT | Facility: OTHER | Age: 45
End: 2020-03-18

## 2020-03-18 NOTE — TELEPHONE ENCOUNTER
1. Caller Name: Ji                   Call Back Number: [phone] PCP or Specialty Provider: no         2.  Does patient have any active symptoms of respiratory illness (fever OR cough OR shortness of breath)? Cough scratchy throat    .  Does patient have any comoribidities? DM    4.  In the last 30 days, has the patient traveled outside of the country OR in a high risk area within the [place] OR have any known contact with someone who has or is suspected to have COVID-19?  No.    5. Disposition: Advised to perform self care, monitor for worsening symptoms and to call back in 3 days if no improvement    Note routed to PCP: no PCP in process of establishing..  wishes to be tested for covid-19  [facility] number given for information.

## 2020-04-06 ENCOUNTER — OFFICE VISIT (OUTPATIENT)
Dept: URGENT CARE | Facility: PHYSICIAN GROUP | Age: 45
End: 2020-04-06
Payer: COMMERCIAL

## 2020-04-06 VITALS
BODY MASS INDEX: 43.32 KG/M2 | DIASTOLIC BLOOD PRESSURE: 72 MMHG | HEART RATE: 115 BPM | SYSTOLIC BLOOD PRESSURE: 142 MMHG | RESPIRATION RATE: 16 BRPM | WEIGHT: 260 LBS | OXYGEN SATURATION: 95 % | HEIGHT: 65 IN | TEMPERATURE: 97.5 F

## 2020-04-06 DIAGNOSIS — F43.9 STRESS: ICD-10-CM

## 2020-04-06 DIAGNOSIS — Z86.39 HISTORY OF ELEVATED GLUCOSE: ICD-10-CM

## 2020-04-06 LAB
APPEARANCE UR: CLEAR
BILIRUB UR STRIP-MCNC: NORMAL MG/DL
COLOR UR AUTO: YELLOW
GLUCOSE BLD-MCNC: 221 MG/DL (ref 70–100)
GLUCOSE UR STRIP.AUTO-MCNC: NORMAL MG/DL
KETONES UR STRIP.AUTO-MCNC: NORMAL MG/DL
LEUKOCYTE ESTERASE UR QL STRIP.AUTO: NORMAL
NITRITE UR QL STRIP.AUTO: 0.2
PH UR STRIP.AUTO: 5 [PH] (ref 5–8)
PROT UR QL STRIP: NORMAL MG/DL
RBC UR QL AUTO: NORMAL
SP GR UR STRIP.AUTO: 1.03
UROBILINOGEN UR STRIP-MCNC: 0.2 MG/DL

## 2020-04-06 PROCEDURE — 81002 URINALYSIS NONAUTO W/O SCOPE: CPT | Performed by: FAMILY MEDICINE

## 2020-04-06 PROCEDURE — 82962 GLUCOSE BLOOD TEST: CPT | Performed by: FAMILY MEDICINE

## 2020-04-06 PROCEDURE — 99214 OFFICE O/P EST MOD 30 MIN: CPT | Performed by: FAMILY MEDICINE

## 2020-04-06 RX ORDER — PREDNISONE 10 MG/1
10 TABLET ORAL EVERY MORNING
Qty: 5 TAB | Refills: 0 | Status: SHIPPED | OUTPATIENT
Start: 2020-04-06 | End: 2020-04-10

## 2020-04-06 RX ORDER — DIAZEPAM 5 MG/1
5 TABLET ORAL EVERY 12 HOURS PRN
Qty: 10 TAB | Refills: 0 | Status: SHIPPED | OUTPATIENT
Start: 2020-04-06 | End: 2020-04-10

## 2020-04-06 RX ORDER — PREDNISONE 20 MG/1
30 TABLET ORAL ONCE
Status: COMPLETED | OUTPATIENT
Start: 2020-04-06 | End: 2020-04-06

## 2020-04-06 RX ADMIN — PREDNISONE 30 MG: 20 TABLET ORAL at 14:36

## 2020-04-06 ASSESSMENT — FIBROSIS 4 INDEX: FIB4 SCORE: 0.54

## 2020-04-06 ASSESSMENT — ENCOUNTER SYMPTOMS
INSOMNIA: 1
NERVOUS/ANXIOUS: 1

## 2020-04-06 NOTE — PROGRESS NOTES
"Subjective:      Ji Montoya is a 45 y.o. male who presents with Anxiety (unable to [id number])    - This is a pleasant and non toxic appearing 45 y.o. male with c/o trouble sleeping and feeling anxiety x 2-3 days. Says gets a \"panic attack\" about once every 1-2hrs or before he is about to fall asleep and these spells last . Describes \"panic attack\" as feeling nervous and jittery. Says he has been on steroids for almost 2 months, he is not sure of dose and says he was not on a taper. Looks like he was last prescribed prednisone 20mg daily for past 2 days. His symptoms seem to have started 1-2 days after stopping his prednisone. Has PCP appt in 3-4 days.     - Hx DM, sugars running 100-200       No associated CP/palpitations/SOB, no lightheadedness. No NVFC. No cough. Says he is eating/drinking well. Says otherwise he is feeling much better then he has been feeling over past 2 months due to CAP.        * May be anxiety. May due to fact to stopping steroids abruptly. Electrolyte abnormality, thyroid?    ALLERGIES:  Patient has no known allergies.     PMH:  History reviewed. No pertinent past medical history.     PSH:  History reviewed. No pertinent surgical history.    MEDS:    Current Outpatient Medications:   •  diazePAM (VALIUM) 5 MG Tab, Take 1 Tab by mouth every 12 hours as needed for Anxiety or Sleep for up to 10 days., Disp: 10 Tab, Rfl: 0  •  predniSONE (DELTASONE) 10 MG Tab, Take 1 Tab by mouth every morning for 5 days., Disp: 5 Tab, Rfl: 0  •  albuterol 108 (90 Base) MCG/ACT Aero Soln inhalation aerosol, Inhale 2 Puffs by mouth every 6 hours as needed for Shortness of Breath., Disp: 1 Inhaler, Rfl: 10    ** I have documented what I find to be significant in regards to past medical, social, family and surgical history  in my HPI or under PMH/PSH/FH review section, otherwise it is contributory **             HPI    Review of Systems   Psychiatric/Behavioral: The patient is nervous/anxious and has " "insomnia.    All other systems reviewed and are negative.         Objective:     /72   Pulse (!) 115   Temp 36.4 °C (97.5 °F) (Temporal)   Resp 16   Ht 1.651 m (5' 5\")   Wt 117.9 kg (260 lb)   SpO2 95%   BMI 43.27 kg/m²      Physical Exam  Vitals signs and nursing note reviewed.   Constitutional:       General: He is not in acute distress.     Appearance: He is well-developed. He is not diaphoretic.   HENT:      Head: Normocephalic and atraumatic.      Mouth/Throat:      Mouth: Mucous membranes are moist.      Pharynx: Oropharynx is clear. No oropharyngeal exudate.   Eyes:      Conjunctiva/sclera: Conjunctivae normal.   Cardiovascular:      Heart sounds: Normal heart sounds. No murmur.   Pulmonary:      Effort: Pulmonary effort is normal. No respiratory distress.      Breath sounds: Normal breath sounds. No wheezing, rhonchi or rales.   Skin:     General: Skin is warm and dry.   Neurological:      Mental Status: He is alert.      Motor: No abnormal muscle tone.   Psychiatric:         Judgment: Judgment normal.                 Assessment/Plan:         1. Stress  EKG - Clinic Performed    diazePAM (VALIUM) 5 MG Tab    predniSONE (DELTASONE) 10 MG Tab   2. History of elevated glucose  POCT Glucose    POCT Urinalysis     - EKG: NSR, no acute changes    - rest/hydrate   - f/u PCP as planned  - E.R. precautions discussed     Dx & d/c instructions discussed w/ patient and/or family members.     Follow up with PCP (or UC if PCP is unavailable) in 2- days to make sure improving and no further additional treatment needed, ER if not improving or feeling/getting worse.    Any realistic and/or common medication side effects that may have been given today(i.e. Rash, GI upset/constipation, sedation, elevation of BP or blood sugars) reviewed.     Patient left in stable condition      reviewed if narcotics given          "

## 2020-04-08 ENCOUNTER — TELEPHONE (OUTPATIENT)
Dept: MEDICAL GROUP | Facility: MEDICAL CENTER | Age: 45
End: 2020-04-08

## 2020-04-08 NOTE — TELEPHONE ENCOUNTER
Phone Number Called: [phone] (home)       Call outcome: Spoke to patient regarding message below.    Message: spoke with pt to confirm appointment scheduled for 04/10/2020. Pt stated he my want to obtain a pcp closer to his home. Stated he will call to confirm or Cancel appointment tomorrow 04/09/20.

## 2020-04-10 ENCOUNTER — OFFICE VISIT (OUTPATIENT)
Dept: MEDICAL GROUP | Facility: MEDICAL CENTER | Age: 45
End: 2020-04-10
Payer: COMMERCIAL

## 2020-04-10 VITALS
TEMPERATURE: 98.1 F | WEIGHT: 260.14 LBS | HEART RATE: 88 BPM | OXYGEN SATURATION: 95 % | SYSTOLIC BLOOD PRESSURE: 122 MMHG | RESPIRATION RATE: 16 BRPM | HEIGHT: 65 IN | DIASTOLIC BLOOD PRESSURE: 74 MMHG | BODY MASS INDEX: 43.34 KG/M2

## 2020-04-10 DIAGNOSIS — E66.01 CLASS 3 SEVERE OBESITY DUE TO EXCESS CALORIES WITHOUT SERIOUS COMORBIDITY WITH BODY MASS INDEX (BMI) OF 40.0 TO 44.9 IN ADULT (HCC): ICD-10-CM

## 2020-04-10 DIAGNOSIS — J30.9 ALLERGIC RHINITIS, UNSPECIFIED SEASONALITY, UNSPECIFIED TRIGGER: ICD-10-CM

## 2020-04-10 DIAGNOSIS — J45.991 COUGH VARIANT ASTHMA: ICD-10-CM

## 2020-04-10 DIAGNOSIS — D72.829 LEUKOCYTOSIS, UNSPECIFIED TYPE: ICD-10-CM

## 2020-04-10 DIAGNOSIS — Z76.89 ENCOUNTER TO ESTABLISH CARE: ICD-10-CM

## 2020-04-10 DIAGNOSIS — E11.65 TYPE 2 DIABETES MELLITUS WITH HYPERGLYCEMIA, WITHOUT LONG-TERM CURRENT USE OF INSULIN (HCC): ICD-10-CM

## 2020-04-10 DIAGNOSIS — N40.0 BENIGN PROSTATIC HYPERPLASIA WITHOUT LOWER URINARY TRACT SYMPTOMS: ICD-10-CM

## 2020-04-10 DIAGNOSIS — G47.09 OTHER INSOMNIA: ICD-10-CM

## 2020-04-10 DIAGNOSIS — J18.9 COMMUNITY ACQUIRED PNEUMONIA, UNSPECIFIED LATERALITY: ICD-10-CM

## 2020-04-10 PROBLEM — E87.20 LACTIC ACIDOSIS: Status: RESOLVED | Noted: 2020-02-28 | Resolved: 2020-04-10

## 2020-04-10 PROBLEM — A41.9 SEPSIS (HCC): Status: RESOLVED | Noted: 2020-02-28 | Resolved: 2020-04-10

## 2020-04-10 PROCEDURE — 99214 OFFICE O/P EST MOD 30 MIN: CPT | Performed by: FAMILY MEDICINE

## 2020-04-10 RX ORDER — PREDNISONE 5 MG/1
5 TABLET ORAL DAILY
Qty: 5 TAB | Refills: 0 | Status: SHIPPED | OUTPATIENT
Start: 2020-04-10 | End: 2021-09-16

## 2020-04-10 ASSESSMENT — PATIENT HEALTH QUESTIONNAIRE - PHQ9: CLINICAL INTERPRETATION OF PHQ2 SCORE: 0

## 2020-04-10 ASSESSMENT — ENCOUNTER SYMPTOMS
WHEEZING: 0
FEVER: 0
CHILLS: 0
VOMITING: 0
EYE PAIN: 0
EYE REDNESS: 0
MYALGIAS: 0
COUGH: 0
CONSTIPATION: 0
HEADACHES: 0
INSOMNIA: 1
EYE DISCHARGE: 0
WEIGHT LOSS: 0
FOCAL WEAKNESS: 0
NERVOUS/ANXIOUS: 0
PALPITATIONS: 0
SENSORY CHANGE: 0
SPUTUM PRODUCTION: 0
DIZZINESS: 0
NAUSEA: 0
DEPRESSION: 0
DIARRHEA: 0
HEMOPTYSIS: 0
SINUS PAIN: 0
ABDOMINAL PAIN: 0
SHORTNESS OF BREATH: 0

## 2020-04-10 ASSESSMENT — LIFESTYLE VARIABLES: SUBSTANCE_ABUSE: 0

## 2020-04-10 ASSESSMENT — FIBROSIS 4 INDEX: FIB4 SCORE: 0.54

## 2020-04-10 NOTE — PROGRESS NOTES
FAMILY MEDICINE VISIT                                                               Chief complaint::Diagnoses of Encounter to establish care, Type 2 diabetes mellitus with hyperglycemia, without long-term current use of insulin ([facility]), Leukocytosis, unspecified type, Cough variant asthma, Class 3 severe obesity due to excess calories without serious comorbidity with body mass index (BMI) of 40.0 to 44.9 in adult ([facility]), Community acquired pneumonia, unspecified laterality, Benign prostatic hyperplasia without lower urinary tract symptoms, Allergic rhinitis, unspecified seasonality, unspecified trigger, and Other insomnia were pertinent to this visit.    History of present illness: Ji Montoya is a 45 y.o. male who presented to [facility].    Patient was admitted to hospital n 2/28/2020 for evaluation of SOB and cough.  He was seen outpatient at urgent care and was started on Augmentin and doxycycline, given short course of Medrol Dosepak but he continued to worsen.  In ER he was found to be hypoxic and lactic acid greater than 4.  He was admitted to ICU with diagnosis of sepsis, community-acquired pneumonia and borderline DKA. He was started on IV antibiotics Rocephin.  His symptoms improved.  He was transitioned to Omnicef and given a brief course of prednisone. Discharged on 3/2/2020.    Again seen on urgent care on 4/6/2020 for anxiety and given Valium.  Patient reports that he took Valium for only 2 days and then he is not taking Valium now.      Type 2 diabetes mellitus with hyperglycemia, without long-term current use of insulin ([facility])  This is a chronic problem for this patient.  Patient reports that he is trying to manage his sugars by diet and exercise.  He was prescribed steroids for a month when he was discharged from hospital.  He reports that his sugars are up because of steroids.  He was recently seen in urgent care because he was having anxiety and some symptoms after stopping steroids  abruptly.  He was prescribed steroids 10 mg for 5 days and he reports that he is doing better.  He reports that when he was not taking steroids his fasting sugars were between 120 140, but now he is taking steroids and his blood sugars are between 1 70-1 80.    Patient reports that he is eating very healthy diet now, avoiding any sodas.  He used to walk daily, but now because of COVID-19 crisis he reports that he is not able to do his daily walk.    Cough variant asthma  This is a chronic problem for this patient. He reports that he uses albuterol inhaler rarely if he gets wheezing or shortness of breath.  He was a former smoker.  He quit smoking 8 years ago.    Allergic rhinitis  This is a chronic problem for this patient.  He reports taking Zyrtec as needed for his symptoms.      BPH (benign prostatic hyperplasia)  This is a chronic problem for this patient. Not taking medications.  Denies any urinary complaints today.    Class 3 severe obesity in adult (HCC)  Patient reports that he is trying to eat very healthy to lose his weight. He reports that he used to walk 2 to 3 miles a day but not doing now because of COVID 19 pandemic.    Other insomnia  This is a chronic problem for this patient.  Patient reports that he always had some sleep issues.  He usually gets 4 to 5 hours of sleep at night.  He reports that his insomnia got worse when he stopped taking steroids.  He is taking now half a tablet of marijuana which helps him sleep.  He also took melatonin 3 mg at bedtime which did help a little bit but then again he had to take marijuana pill to get him back to sleep.  He reports that his sleep pattern was better when he doing exercise previously.  He reports that he only drinks 1 cup of coffee in the morning.    Leukocytosis  Reports feeling well today.  He denies any fever, cough, congestion, difficulty breathing.  His last white count was 23.4, 1-month ago.      Health Maintenance:   We will discuss about  immunizations at next visit.    Review of systems:     Review of Systems   Constitutional: Negative for chills, fever, malaise/fatigue and weight loss.   HENT: Negative for ear discharge, ear pain, hearing loss and sinus pain.    Eyes: Negative for pain, discharge and redness.   Respiratory: Negative for cough, hemoptysis, sputum production, shortness of breath and wheezing.    Cardiovascular: Negative for chest pain, palpitations and leg swelling.   Gastrointestinal: Negative for abdominal pain, constipation, diarrhea, nausea and vomiting.   Genitourinary: Negative for dysuria, frequency and urgency.   Musculoskeletal: Negative for joint pain and myalgias.   Skin: Negative for itching and rash.   Neurological: Negative for dizziness, sensory change, focal weakness and headaches.   Endo/Heme/Allergies: Negative for environmental allergies.   Psychiatric/Behavioral: Negative for depression, substance abuse and suicidal ideas. The patient has insomnia. The patient is not nervous/anxious.         Past Medical, Surgical and Family History:    Past Medical History:   Diagnosis Date   • Allergy    • Asthma    • Diabetes (HCC)      Past Surgical History:   Procedure Laterality Date   • TONSILLECTOMY       Family History   Problem Relation Age of Onset   • Hyperlipidemia Mother    • Hypertension Father         Social History:    Social History     Tobacco Use   • Smoking status: Former Smoker     Packs/day: 0.00     Types: Cigarettes   • Smokeless tobacco: Former User     Quit date: 2016   • Tobacco comment: quit 8 years ago, 3 packs for 13 years   Substance Use Topics   • Alcohol use: Not Currently   • Drug use: Yes     Types: Marijuana     Comment: For sleep        Medications and Allergies:     Current Outpatient Medications   Medication Sig Dispense Refill   • predniSONE (DELTASONE) 5 MG Tab Take 1 Tab by mouth every day. 5 Tab 0   • Melatonin 5 MG Cap Take 1 Cap by mouth every bedtime. 90 Cap 1   • albuterol 108 (90  "Base) MCG/ACT Aero Soln inhalation aerosol Inhale 2 Puffs by mouth every 6 hours as needed for Shortness of Breath. 1 Inhaler 10     No current facility-administered medications for this visit.         No Known Allergies    Vitals:    /74 (BP Location: Left arm, Patient Position: Sitting, BP Cuff Size: Adult)   Pulse 88   Temp 36.7 °C (98.1 °F)   Resp 16   Ht 1.651 m (5' 5\")   Wt 118 kg (260 lb 2.3 oz)   SpO2 95%  Body mass index is 43.29 kg/m².    Physical Exam:     Physical Exam   Constitutional: He is well-developed, well-nourished, and in no distress. No distress.   HENT:   Head: Normocephalic and atraumatic.   Right Ear: External ear normal.   Left Ear: External ear normal.   Nose: Nose normal.   Mouth/Throat: Oropharynx is clear and moist. No oropharyngeal exudate.   Eyes: Conjunctivae and EOM are normal. Right eye exhibits no discharge. Left eye exhibits no discharge.   Neck: Neck supple. No thyromegaly present.   Cardiovascular: Normal rate, regular rhythm, normal heart sounds and intact distal pulses.   No murmur heard.  Pulmonary/Chest: Effort normal and breath sounds normal. No respiratory distress. He has no wheezes. He has no rales.   Abdominal: Soft. Bowel sounds are normal. He exhibits no distension. There is no abdominal tenderness. There is no guarding.   Musculoskeletal:         General: No deformity or edema.   Neurological: He is alert.   Skin: Skin is warm. No rash noted. He is not diaphoretic.   Psychiatric: Mood, affect and judgment normal.      Monofilament testing with a 10 gram force: sensation intact: intact bilaterally  Visual Inspection: Feet without maceration, ulcers, fissures.  Pedal pulses: intact bilaterally     Labs:  Ref Range & Units 1mo ago    Sodium 135 - 145 mmol/L 134Low     Potassium 3.6 - 5.5 mmol/L 4.0    Chloride 96 - 112 mmol/L 101    Co2 20 - 33 mmol/L 23    Glucose 65 - 99 mg/dL 255High     Bun 8 - 22 mg/dL 29High     Creatinine 0.50 - 1.40 mg/dL 1.08  "   Calcium 8.5 - 10.5 mg/dL 8.7    Anion Gap 0.0 - 11.9 10.0      Ref Range & Units 1mo ago    WBC 4.8 - 10.8 K/uL 23.4High     RBC 4.70 - 6.10 M/uL 5.76    Hemoglobin 14.0 - 18.0 g/dL 15.8    Hematocrit 42.0 - 52.0 % 48.1    MCV 81.4 - 97.8 fL 83.5    MCH 27.0 - 33.0 pg 27.4    MCHC 33.7 - 35.3 g/dL 32.8Low     RDW 35.9 - 50.0 fL 40.6    Platelet Count 164 - 446 K/uL 313    MPV 9.0 - 12.9 fL 9.9    Neutrophils-Polys 44.00 - 72.00 % 82.50High     Lymphocytes 22.00 - 41.00 % 10.00Low     Monocytes 0.00 - 13.40 % 3.40    Eosinophils 0.00 - 6.90 % 0.00    Basophils 0.00 - 1.80 % 0.30    Immature Granulocytes 0.00 - 0.90 % 3.80High     Nucleated RBC /100 WBC 0.00    Neutrophils (Absolute) 1.82 - 7.42 K/uL 19.26High     Comment: Includes immature neutrophils, if present.   Lymphs (Absolute) 1.00 - 4.80 K/uL 2.34    Monos (Absolute) 0.00 - 0.85 K/uL 0.80    Eos (Absolute) 0.00 - 0.51 K/uL 0.00    Baso (Absolute) 0.00 - 0.12 K/uL 0.08    Immature Granulocytes (abs) 0.00 - 0.11 K/uL 0.88High     NRBC (Absolute) K/uL 0.00      Ref Range & Units 1mo ago    GFR If African American >60 mL/min/1.73 m 2 >60    GFR If Non African American >60 mL/min/1.73 m 2 >60      Ref Range & Units 1mo ago    Cholesterol,Tot 100 - 199 mg/dL 132    Triglycerides 0 - 149 mg/dL 79    HDL >=40 mg/dL 38Abnormal     LDL <100 mg/dL 78       Ref Range & Units 1mo ago   NT-proBNP 0 - 125 pg/mL 143High       Lab Results   Component Value Date/Time    HBA1C 8.6 (H) 02/28/2020 03:04 PM        Chest Xray on 3/1/83821: Low lung volume. Diffuse interstitial prominence. No pulmonary infiltrates or consolidations are noted. No pleural effusion. No pneumothorax. Stable cardiopericardial silhouette.    EKG on 2/28/2020.   Rate:       90                           P:          38   HI:         156                          QRS:        34   QRSD:       86                           T:          29   QT:         356   QTc:        436     Interpretive Statements:  SINUS  RHYTHM   BASELINE WANDER IN LEAD(S) V4   No previous ECG available for comparison       Echocardiogram 2/29/20202: Normal left ventricular systolic function, Left ventricular ejection fraction is visually estimated to be 65%. Moderately dilated right ventricle. Normal right ventricular systolic function.    Assessment/Plan:    Ji was seen today for establish care.    Diagnoses and all orders for this visit:    Encounter to establish care    Type 2 diabetes mellitus with hyperglycemia, without long-term current use of insulin (HCC):  · Last A1c 8.6, uncontrolled.  Goal A1c less than 7.  · Patient is not interested in taking medication at this time as patient thinks that his blood sugars are high because of steroids.  He did tried metformin in the past but did had complications: diarrhea from metformin.  · As he was getting withdrawal from steroids, will give prednisone 5 mg for 5 days to complete the taper.  · We had a long discussion about long-term complications of diabetes, importance of good control, importance of healthy diet and exercise in controlling diabetes.   · Patient want to try diet and exercise to control his diabetes.  · We will get his blood work before next appointment: A1c, CMP, microalbumin creatinine ratio, lipid panel.  · Eye exam done today but was not able to take pictures from right eye, will refer to ophthalmology at next visit.  · Diabetic foot exam done today and was normal.  · His last lipid panel showed LDL 78, ASCVD risk 3.2.  Discussed with patient that as he has diabetes, LDL goal is below 70.  Given option to start atorvastatin.  But patient want to try diet and exercise first.    -     Comp Metabolic Panel; Future  -     HEMOGLOBIN A1C; Future  -     MICROALBUMIN CREAT RATIO URINE; Future  -     Lipid Profile; Future  -     POCT Retinal Eye Exam  -     Diabetic Monofilament Lower Extremity Exam    Leukocytosis, unspecified type:  · Patient afebrile, symptoms improved.  Recheck  CBC.    -     CBC WITH DIFFERENTIAL; Future    Cough variant asthma:  · We will complete steroid taper with 5 mg for 5 days as patient was having steroid withdrawal after abruptly stopping steroids.  · Advised to continue using albuterol inhaler as needed for shortness of breath and wheezing.    -     predniSONE (DELTASONE) 5 MG Tab; Take 1 Tab by mouth every day.    Class 3 severe obesity due to excess calories without serious comorbidity with body mass index (BMI) of 40.0 to 44.9 in adult (HCC):    BMI smart phrase:  Current BMI is Body mass index is 43.29 kg/m².    We discussed obesity associated medical conditions such as amenorrhea, congestive heart failure, coronary artery disease, T2DM, depression, hyperlipidemia, hypertension, osteoarthritis, pulmonary disease and sleep apnea.   Based on our interaction, he appears to be in action stage with respect to weight loss.  Advised to continue eating healthy diet and exercise.    -     Patient identified as having weight management issue.  Appropriate orders and counseling given.    Community acquired pneumonia, unspecified laterality:  · Patient asymptomatic now.    Benign prostatic hyperplasia without lower urinary tract symptoms:  · Controlled without any medications.    Allergic rhinitis, unspecified seasonality, unspecified trigger:  · Controlled with Zyrtec as needed.    Other insomnia:  · We will try melatonin 5 mg once daily for his insomnia.  Discussed with patient about long-term complications and side effects of marijuana use.  Patient reports understanding and want to try melatonin 5 mg once daily for his insomnia.  · Patient does snore at night but does not know if he stops breathing.  We will reassess his symptoms at next visit and will refer to sleep medicine for sleep study.    -     Melatonin 5 MG Cap; Take 1 Cap by mouth every bedtime.       Please note that this dictation was created using voice recognition software. I have made every reasonable  attempt to correct obvious errors, but I expect that there are errors of grammar and possibly content that I did not discover before finalizing the note.    Follow up in two and 1/2-month for lab follow-up.

## 2020-04-10 NOTE — ASSESSMENT & PLAN NOTE
This is a chronic problem for this patient. He reports that he uses albuterol inhaler rarely if he gets wheezing or shortness of breath.  He was a former smoker.  He quit smoking 8 years ago.

## 2020-04-10 NOTE — ASSESSMENT & PLAN NOTE
This is a chronic problem for this patient.  He reports taking Zyrtec as needed for his symptoms.

## 2020-04-10 NOTE — ASSESSMENT & PLAN NOTE
This is a chronic problem for this patient. Not taking medications.  Denies any urinary complaints today.

## 2020-04-10 NOTE — ASSESSMENT & PLAN NOTE
This is a chronic problem for this patient.  Patient reports that he is trying to manage his sugars by diet and exercise.  He was prescribed steroids for a month when he was discharged from hospital.  He reports that his sugars are up because of steroids.  He was recently seen in urgent care because he was having anxiety and some symptoms after stopping steroids abruptly.  He was prescribed steroids 10 mg for 5 days and he reports that he is doing better.  He reports that when he was not taking steroids his fasting sugars were between 120 140, but now he is taking steroids and his blood sugars are between 1 70-1 80.    Patient reports that he is eating very healthy diet now, avoiding any sodas.  He used to walk daily, but now because of COVID-19 crisis he reports that he is not able to do his daily walk.

## 2020-04-10 NOTE — ASSESSMENT & PLAN NOTE
This is a chronic problem for this patient.  Patient reports that he always had some sleep issues.  He usually gets 4 to 5 hours of sleep at night.  He reports that his insomnia got worse when he stopped taking steroids.  He is taking now half a tablet of marijuana which helps him sleep.  He also took melatonin 3 mg at bedtime which did help a little bit but then again he had to take marijuana pill to get him back to sleep.  He reports that his sleep pattern was better when he doing exercise previously.  He reports that he only drinks 1 cup of coffee in the morning.

## 2020-04-10 NOTE — ASSESSMENT & PLAN NOTE
Reports feeling well today.  He denies any fever, cough, congestion, difficulty breathing.  His last white count was 23.4, 1-month ago.

## 2020-04-10 NOTE — ASSESSMENT & PLAN NOTE
Patient reports that he is trying to eat very healthy to lose his weight. He reports that he used to walk 2 to 3 miles a day but not doing now because of COVID 19 pandemic.

## 2020-07-30 DIAGNOSIS — G47.09 OTHER INSOMNIA: ICD-10-CM

## 2020-07-30 RX ORDER — MULTIVITAMIN/IRON/FOLIC ACID 18MG-0.4MG
TABLET ORAL
Qty: 120 TAB | Refills: 1 | Status: SHIPPED | OUTPATIENT
Start: 2020-07-30 | End: 2021-09-16

## 2020-11-10 ENCOUNTER — OFFICE VISIT (OUTPATIENT)
Dept: URGENT CARE | Facility: PHYSICIAN GROUP | Age: 45
End: 2020-11-10
Payer: COMMERCIAL

## 2020-11-10 VITALS
TEMPERATURE: 98.3 F | RESPIRATION RATE: 20 BRPM | HEIGHT: 65 IN | HEART RATE: 90 BPM | OXYGEN SATURATION: 99 % | DIASTOLIC BLOOD PRESSURE: 84 MMHG | SYSTOLIC BLOOD PRESSURE: 142 MMHG | WEIGHT: 260 LBS | BODY MASS INDEX: 43.32 KG/M2

## 2020-11-10 DIAGNOSIS — K04.7 DENTAL ABSCESS: ICD-10-CM

## 2020-11-10 PROCEDURE — 99213 OFFICE O/P EST LOW 20 MIN: CPT | Performed by: PHYSICIAN ASSISTANT

## 2020-11-10 RX ORDER — CLINDAMYCIN HYDROCHLORIDE 75 MG/1
75 CAPSULE ORAL 4 TIMES DAILY
COMMUNITY
End: 2021-09-16

## 2020-11-10 ASSESSMENT — ENCOUNTER SYMPTOMS
SHORTNESS OF BREATH: 0
VOMITING: 0
NAUSEA: 0
FEVER: 0
CHILLS: 0

## 2020-11-10 ASSESSMENT — FIBROSIS 4 INDEX: FIB4 SCORE: 0.54

## 2020-11-11 NOTE — PROGRESS NOTES
"Subjective:   Ji Montoya  is a 45 y.o. male who presents for Tooth Abscess (since last night went to ER yesterday )      HPI    Patient comes clinic complaining of dental pain, swelling and likely abscess right upper tooth.  Notes pending dental extraction.  Did go to [facility] ER last night and was treated with medications patient does not recall.  He states an injection as well as an antibiotic.  He denies fevers chills.  Notes headache and associated pressure pain.  He states he was told by the ER last night to come back if he felt discomfort pushing on right eye.  Patient states he has been feeling bad today.  He did follow-up with his dentist this morning who stated there is nothing he can do for him because the infection was too far gone.  Patient comes to clinic open we can drain the abscess.    Review of Systems   Constitutional: Negative for chills and fever.   HENT:        Dental infection, swelling and pain right upper   Respiratory: Negative for shortness of breath.    Gastrointestinal: Negative for nausea and vomiting.   Skin: Negative for rash.       No Known Allergies     Objective:   /84   Pulse 90   Temp 36.8 °C (98.3 °F) (Temporal)   Resp 20   Ht 1.651 m (5' 5\")   Wt 117.9 kg (260 lb)   SpO2 99%   BMI 43.27 kg/m²     Physical Exam  Vitals signs and nursing note reviewed.   Constitutional:       General: He is not in acute distress.     Appearance: He is well-developed. He is not diaphoretic.   HENT:      Head: Normocephalic and atraumatic.        Right Ear: External ear normal.      Left Ear: External ear normal.      Nose: Nose normal.      Mouth/Throat:      Lips: Pink.      Mouth: Mucous membranes are moist.      Dentition: Abnormal dentition. Dental tenderness, gingival swelling, dental caries and dental abscesses present.      Pharynx: Oropharynx is clear. Uvula midline.      Tonsils: No tonsillar exudate.   Eyes:      General: No scleral icterus.        Right " eye: No discharge.         Left eye: No discharge.      Conjunctiva/sclera: Conjunctivae normal.   Neck:      Musculoskeletal: Neck supple.   Pulmonary:      Effort: Pulmonary effort is normal. No respiratory distress.   Musculoskeletal: Normal range of motion.   Skin:     General: Skin is warm and dry.      Coloration: Skin is not pale.   Neurological:      Mental Status: He is alert and oriented to person, place, and time.      Coordination: Coordination normal.         Assessment/Plan:   1. Dental abscess    Other orders  - clindamycin (CLEOCIN) 75 MG Cap; Take 75 mg by mouth 4 times a day.    Patient is directed back to the ER now-he will go to [facility] ER-his significant other is with him and will transport him the short distance to the emergency room    I have worn an N95 mask, gloves and eye protection for the entire encounter with this patient.

## 2020-12-30 ENCOUNTER — HOSPITAL ENCOUNTER (OUTPATIENT)
Dept: LAB | Facility: MEDICAL CENTER | Age: 45
End: 2020-12-30
Payer: COMMERCIAL

## 2020-12-30 LAB — COVID ORDER STATUS COVID19: NORMAL

## 2020-12-31 LAB
SARS-COV-2 RNA RESP QL NAA+PROBE: NOTDETECTED
SPECIMEN SOURCE: NORMAL

## 2021-09-16 ENCOUNTER — HOSPITAL ENCOUNTER (OUTPATIENT)
Facility: MEDICAL CENTER | Age: 46
End: 2021-09-16
Attending: EMERGENCY MEDICINE
Payer: COMMERCIAL

## 2021-09-16 ENCOUNTER — OFFICE VISIT (OUTPATIENT)
Dept: URGENT CARE | Facility: PHYSICIAN GROUP | Age: 46
End: 2021-09-16
Payer: COMMERCIAL

## 2021-09-16 VITALS
SYSTOLIC BLOOD PRESSURE: 140 MMHG | HEART RATE: 80 BPM | TEMPERATURE: 97.8 F | BODY MASS INDEX: 40.18 KG/M2 | WEIGHT: 250 LBS | RESPIRATION RATE: 16 BRPM | OXYGEN SATURATION: 98 % | HEIGHT: 66 IN | DIASTOLIC BLOOD PRESSURE: 82 MMHG

## 2021-09-16 DIAGNOSIS — H60.502 ACUTE OTITIS EXTERNA OF LEFT EAR, UNSPECIFIED TYPE: ICD-10-CM

## 2021-09-16 DIAGNOSIS — J00 ACUTE NASOPHARYNGITIS: ICD-10-CM

## 2021-09-16 DIAGNOSIS — H61.22 CERUMINOSIS, LEFT: ICD-10-CM

## 2021-09-16 LAB
INT CON NEG: NORMAL
INT CON POS: NORMAL
S PYO AG THROAT QL: NEGATIVE

## 2021-09-16 PROCEDURE — 0240U HCHG SARS-COV-2 COVID-19 NFCT DS RESP RNA 3 TRGT MIC: CPT

## 2021-09-16 PROCEDURE — 99213 OFFICE O/P EST LOW 20 MIN: CPT | Performed by: EMERGENCY MEDICINE

## 2021-09-16 PROCEDURE — 87880 STREP A ASSAY W/OPTIC: CPT | Performed by: EMERGENCY MEDICINE

## 2021-09-16 RX ORDER — CIPROFLOXACIN AND DEXAMETHASONE 3; 1 MG/ML; MG/ML
4 SUSPENSION/ DROPS AURICULAR (OTIC) 2 TIMES DAILY
Qty: 2.8 ML | Refills: 0 | Status: SHIPPED | OUTPATIENT
Start: 2021-09-16 | End: 2021-09-23

## 2021-09-16 ASSESSMENT — ENCOUNTER SYMPTOMS
HEADACHES: 1
COUGH: 1
MYALGIAS: 0
SHORTNESS OF BREATH: 0
TROUBLE SWALLOWING: 0
CHILLS: 1
VOMITING: 0
WHEEZING: 0
DIARRHEA: 0

## 2021-09-16 ASSESSMENT — FIBROSIS 4 INDEX: FIB4 SCORE: 0.55

## 2021-09-17 NOTE — PROGRESS NOTES
"Subjective     Ji Montoya is a 46 y.o. male who presents with Sore Throat (x2 days, sore throat, sinus pain, headache, cough )            Pharyngitis   This is a new problem. The current episode started yesterday. The problem has been gradually worsening. Neither side of throat is experiencing more pain than the other. Maximum temperature: chills. Associated symptoms include congestion, coughing, ear pain, headaches and a plugged ear sensation. Pertinent negatives include no diarrhea, ear discharge, shortness of breath, trouble swallowing or vomiting. He has had no exposure to strep.   Unvaccinated for COVID-19.  PMH pneumonia, sepsis. Notes left lower tooth infection recently, resolved after drainage.    Review of Systems   Constitutional: Positive for chills. Negative for malaise/fatigue.   HENT: Positive for congestion, ear pain and hearing loss. Negative for ear discharge, tinnitus and trouble swallowing.    Respiratory: Positive for cough. Negative for shortness of breath and wheezing.    Gastrointestinal: Negative for diarrhea and vomiting.   Musculoskeletal: Negative for myalgias.   Neurological: Positive for headaches.              Objective     /82   Pulse 80   Temp 36.6 °C (97.8 °F) (Temporal)   Resp 16   Ht 1.676 m (5' 6\")   Wt 113 kg (250 lb)   SpO2 98%   BMI 40.35 kg/m²      Physical Exam  Vitals reviewed.   Constitutional:       General: He is not in acute distress.     Appearance: He is well-developed. He is not ill-appearing.   HENT:      Head: Normocephalic.      Jaw: No trismus.      Right Ear: Tympanic membrane, ear canal and external ear normal.      Left Ear: Swelling and tenderness present. No drainage.  No middle ear effusion. No mastoid tenderness. Tympanic membrane is not injected, erythematous or bulging.      Nose: Mucosal edema present. No rhinorrhea.      Mouth/Throat:      Pharynx: Uvula midline. Posterior oropharyngeal erythema present. No oropharyngeal exudate " or uvula swelling.      Tonsils: 0 on the right. 0 on the left.   Eyes:      Conjunctiva/sclera:      Right eye: Right conjunctiva is injected. No exudate or hemorrhage.     Left eye: Left conjunctiva is injected. No exudate or hemorrhage.  Neck:      Trachea: Trachea and phonation normal.   Cardiovascular:      Rate and Rhythm: Normal rate and regular rhythm.      Heart sounds: Normal heart sounds.   Pulmonary:      Effort: Pulmonary effort is normal.      Breath sounds: Normal breath sounds.   Musculoskeletal:      Cervical back: Neck supple.   Lymphadenopathy:      Head:      Left side of head: No submandibular adenopathy.      Cervical: No cervical adenopathy.   Skin:     General: Skin is warm and dry.   Neurological:      Mental Status: He is alert.      Cranial Nerves: No facial asymmetry.   Psychiatric:         Behavior: Behavior normal. Behavior is cooperative.                         Advised return for recheck of left ear fullness and discomfort not resolving within 2 days. Also advised recheck if any worsening cough.    Assessment & Plan        1. Acute nasopharyngitis  negative- POCT Rapid Strep A  - CoV-2 and Flu A/B by PCR (24 hour In-House): Collect NP swab in VTM; Future  Recommended supportive care measures, including rest, increasing oral fluid intake and use of over-the-counter medications for relief of symptoms.    2. Ceruminosis, left  Aural lavage    3. Acute otitis externa of left ear, unspecified type  Rx CiproDex

## 2021-09-18 ENCOUNTER — TELEPHONE (OUTPATIENT)
Dept: URGENT CARE | Facility: PHYSICIAN GROUP | Age: 46
End: 2021-09-18

## 2021-09-18 LAB
FLUAV RNA SPEC QL NAA+PROBE: NEGATIVE
FLUBV RNA SPEC QL NAA+PROBE: NEGATIVE
SARS-COV-2 RNA RESP QL NAA+PROBE: NOTDETECTED
SPECIMEN SOURCE: NORMAL

## 2021-09-19 ENCOUNTER — OFFICE VISIT (OUTPATIENT)
Dept: URGENT CARE | Facility: PHYSICIAN GROUP | Age: 46
End: 2021-09-19
Payer: COMMERCIAL

## 2021-09-19 VITALS
HEART RATE: 99 BPM | DIASTOLIC BLOOD PRESSURE: 80 MMHG | TEMPERATURE: 97.3 F | HEIGHT: 66 IN | WEIGHT: 250 LBS | RESPIRATION RATE: 16 BRPM | BODY MASS INDEX: 40.18 KG/M2 | OXYGEN SATURATION: 99 % | SYSTOLIC BLOOD PRESSURE: 122 MMHG

## 2021-09-19 DIAGNOSIS — R05.9 COUGH: ICD-10-CM

## 2021-09-19 DIAGNOSIS — M62.838 TRAPEZIUS MUSCLE SPASM: ICD-10-CM

## 2021-09-19 PROCEDURE — 99214 OFFICE O/P EST MOD 30 MIN: CPT | Performed by: PHYSICIAN ASSISTANT

## 2021-09-19 RX ORDER — KETOROLAC TROMETHAMINE 30 MG/ML
30 INJECTION, SOLUTION INTRAMUSCULAR; INTRAVENOUS ONCE
Status: COMPLETED | OUTPATIENT
Start: 2021-09-19 | End: 2021-09-19

## 2021-09-19 RX ORDER — CYCLOBENZAPRINE HCL 10 MG
10 TABLET ORAL 3 TIMES DAILY PRN
Qty: 30 TABLET | Refills: 0 | Status: SHIPPED | OUTPATIENT
Start: 2021-09-19 | End: 2021-10-08

## 2021-09-19 RX ORDER — PREDNISONE 20 MG/1
40 TABLET ORAL DAILY
Qty: 10 TABLET | Refills: 0 | Status: SHIPPED | OUTPATIENT
Start: 2021-09-19 | End: 2021-09-24

## 2021-09-19 RX ADMIN — KETOROLAC TROMETHAMINE 30 MG: 30 INJECTION, SOLUTION INTRAMUSCULAR; INTRAVENOUS at 09:28

## 2021-09-19 ASSESSMENT — ENCOUNTER SYMPTOMS
HEADACHES: 0
SHORTNESS OF BREATH: 0
TINGLING: 0
FALLS: 0
SPUTUM PRODUCTION: 1
MYALGIAS: 1
ABDOMINAL PAIN: 0
VOMITING: 0
DIZZINESS: 0
NAUSEA: 0
COUGH: 1
BACK PAIN: 0
NECK PAIN: 1
SINUS PAIN: 0
WEAKNESS: 0
SENSORY CHANGE: 0
SORE THROAT: 0
CHILLS: 0
FEVER: 0

## 2021-09-19 ASSESSMENT — FIBROSIS 4 INDEX: FIB4 SCORE: 0.55

## 2021-09-19 NOTE — PROGRESS NOTES
Subjective:   Ji Montoya is a 46 y.o. male who presents for Neck Pain (down R arm started @4am )    HPI:  This is a very pleasant 46-year-old male presenting to the clinic with acute onset right-sided neck and arm pain starting at 4 AM this morning.  Patient states right when he got up out of bed he noticed a sharp shooting pain down the right side of his neck into his right shoulder.  Believes he may have slept wrong.  Denies any preceding injury or trauma.  Pain is aggravated with any neck or shoulder movement.  Described as a sharp shooting pain.  Denies any numbness or tingling to the upper extremities.  Denies any headaches or dizziness.  Took 2 Advil this morning which provided mild improvement.  Patient was also seen in clinic on 9/16/2021.  At that time he was dealing with upper respiratory infection-like symptoms.  States his cough has remained fairly persistent.  He did test negative for COVID-19 and strep at that visit.  Currently undergoing treatment for otitis externa with his prescribed drops.  Denies any fevers or chills.  Cough is slightly productive of clear sputum.      Review of Systems   Constitutional: Negative for chills, fever and malaise/fatigue.   HENT: Positive for congestion. Negative for sinus pain and sore throat.    Respiratory: Positive for cough and sputum production. Negative for shortness of breath.    Gastrointestinal: Negative for abdominal pain, nausea and vomiting.   Musculoskeletal: Positive for myalgias and neck pain. Negative for back pain and falls.   Neurological: Negative for dizziness, tingling, sensory change, weakness and headaches.       Medications:    • albuterol Aers  • ciprofloxacin/dexamethasone Susp  • cyclobenzaprine Tabs  • ketorolac  • predniSONE Tabs    Allergies: Patient has no known allergies.    Problem List: Ji Montoya does not have any pertinent problems on file.    Surgical History:  Past Surgical History:   Procedure Laterality  "Date   • TONSILLECTOMY         Past Social Hx: Ji Montoya  reports that he has quit smoking. His smoking use included cigarettes. He smoked 0.00 packs per day. He quit smokeless tobacco use about 5 years ago. He reports previous alcohol use. He reports current drug use. Drug: Marijuana.     Past Family Hx:  Ji Montoya family history includes Hyperlipidemia in his mother; Hypertension in his father.     Problem list, medications, and allergies reviewed by myself today in Epic.     Objective:     /80   Pulse 99   Temp 36.3 °C (97.3 °F) (Temporal)   Resp 16   Ht 1.676 m (5' 6\")   Wt 113 kg (250 lb)   SpO2 99%   BMI 40.35 kg/m²     Physical Exam  Constitutional:       General: He is not in acute distress.     Appearance: Normal appearance. He is not ill-appearing, toxic-appearing or diaphoretic.   HENT:      Head: Normocephalic and atraumatic.      Right Ear: Tympanic membrane, ear canal and external ear normal.      Left Ear: Tympanic membrane, ear canal and external ear normal.      Nose: Rhinorrhea present.   Eyes:      Comments: Injected conjunctiva bilaterally   Neck:      Comments: Cervical exam: No midline tenderness to palpation.  Right-sided paraspinal and trapezius muscle tenderness.  Right-sided trapezius muscle spasm.  Cervical range of motion is limited at this time in all directions due to pain.  Full range of motion of bilateral upper extremities.  Upper extremity strength 5/5 bilaterally.  Cardiovascular:      Rate and Rhythm: Normal rate and regular rhythm.      Pulses: Normal pulses.      Heart sounds: Normal heart sounds.   Pulmonary:      Effort: Pulmonary effort is normal.      Breath sounds: Normal breath sounds. No wheezing, rhonchi or rales.   Musculoskeletal:         General: Normal range of motion.   Skin:     General: Skin is warm.      Capillary Refill: Capillary refill takes less than 2 seconds.      Findings: No bruising or rash.   Neurological:      " General: No focal deficit present.      Mental Status: He is alert and oriented to person, place, and time. Mental status is at baseline.           Assessment/Plan:     Comments/MDM:     • 30 mg Toradol provided in clinic.  Patient tolerated this well.  • Supportive care recommendations discussed.  We will start the patient on a course of oral steroids and Flexeril.  Do not drive or operate machinery while taking Flexeril.  • Encouraged alternating ice and heat.  Encouraged gentle range of motion and stretching exercises.  • Lungs were clear to auscultation.  SPO2 99% on room air.  Recently tested negative for COVID-19.  • Return to the clinic for any persistence or worsening of symptoms.     Diagnosis and associated orders:     1. Trapezius muscle spasm  ketorolac (TORADOL) injection 30 mg    predniSONE (DELTASONE) 20 MG Tab    cyclobenzaprine (FLEXERIL) 10 mg Tab   2. Cough  predniSONE (DELTASONE) 20 MG Tab            Differential diagnosis, natural history, supportive care, and indications for immediate follow-up discussed.    Advised the patient to follow-up with the primary care physician for recheck, reevaluation, and consideration of further management.    Please note that this dictation was created using voice recognition software. I have made reasonable attempt to correct obvious errors, but I expect that there are errors of grammar and possibly content that I did not discover before finalizing the note.    This note was electronically signed by ANNETTE Hummel PA-C

## 2021-10-08 ENCOUNTER — OFFICE VISIT (OUTPATIENT)
Dept: URGENT CARE | Facility: PHYSICIAN GROUP | Age: 46
End: 2021-10-08
Payer: COMMERCIAL

## 2021-10-08 VITALS
TEMPERATURE: 97 F | WEIGHT: 250 LBS | SYSTOLIC BLOOD PRESSURE: 134 MMHG | HEIGHT: 66 IN | RESPIRATION RATE: 18 BRPM | BODY MASS INDEX: 40.18 KG/M2 | HEART RATE: 74 BPM | DIASTOLIC BLOOD PRESSURE: 82 MMHG | OXYGEN SATURATION: 97 %

## 2021-10-08 DIAGNOSIS — H66.002 NON-RECURRENT ACUTE SUPPURATIVE OTITIS MEDIA OF LEFT EAR WITHOUT SPONTANEOUS RUPTURE OF TYMPANIC MEMBRANE: ICD-10-CM

## 2021-10-08 DIAGNOSIS — M26.629 TMJ PAIN DYSFUNCTION SYNDROME: ICD-10-CM

## 2021-10-08 PROCEDURE — 99213 OFFICE O/P EST LOW 20 MIN: CPT | Performed by: FAMILY MEDICINE

## 2021-10-08 RX ORDER — AMOXICILLIN 875 MG/1
875 TABLET, COATED ORAL 2 TIMES DAILY
Qty: 10 TABLET | Refills: 0 | Status: SHIPPED | OUTPATIENT
Start: 2021-10-08 | End: 2021-10-13

## 2021-10-08 ASSESSMENT — FIBROSIS 4 INDEX: FIB4 SCORE: 0.55

## 2021-10-08 NOTE — PROGRESS NOTES
"  Subjective:      46 y.o. male presents to urgent care for left-sided jaw pain that worsened last night around 1230.  There was no inciting event or trauma.  Pain is constant, described as sharp.  He tried Tylenol and aspirin, neither of which resolved the pain.  He also has associated left ear pain, with no change in hearing.  No drainage from the ear.  Pain seems to be worse with opening and closing his mouth.  There is no locking or clicking sensation of his jaw.  He remains afebrile.  He denies sore throat, chest pain, nausea, or diaphoresis.  Last dental exam was within the last couple of months.    He denies any other questions or concerns at this time.    Current problem list, medication, and past medical/surgical history were reviewed in Epic.    ROS  See HPI     Objective:      /82   Pulse 74   Temp 36.1 °C (97 °F) (Temporal)   Resp 18   Ht 1.676 m (5' 6\")   Wt 113 kg (250 lb)   SpO2 97%   BMI 40.35 kg/m²     Physical Exam  Constitutional:       General: He is not in acute distress.     Appearance: He is not diaphoretic.   HENT:      Head: Normocephalic and atraumatic.      Comments: He has not sensitive to touch along the jawline or scalp.  He endorses pain with opening and closing of jaw.     Right Ear: Tympanic membrane, ear canal and external ear normal.      Left Ear: Swelling and tenderness present. Tympanic membrane is injected, erythematous and bulging.      Nose:      Right Sinus: No maxillary sinus tenderness or frontal sinus tenderness.      Left Sinus: No maxillary sinus tenderness or frontal sinus tenderness.      Mouth/Throat:      Palate: No lesions.      Pharynx: Oropharynx is clear. Uvula midline. No posterior oropharyngeal erythema.   Cardiovascular:      Rate and Rhythm: Normal rate and regular rhythm.      Heart sounds: Normal heart sounds.   Pulmonary:      Effort: Pulmonary effort is normal. No respiratory distress.      Breath sounds: Normal breath sounds. "   Musculoskeletal:      Cervical back: Tenderness (left and anterior aspect) present. No rigidity.   Neurological:      Mental Status: He is alert.   Psychiatric:         Mood and Affect: Affect normal.         Judgment: Judgment normal.       Assessment/Plan:     1. Non-recurrent acute suppurative otitis media of left ear without spontaneous rupture of tympanic membrane  2. TMJ pain dysfunction syndrome  Although his left ear is definitely exhibiting signs of otitis media, I am also questioning if there is some TMJ dysfunction as well.  Prescription for amoxicillin has been given.  Patient was encouraged to take ibuprofen scheduled 3 times daily for the next 5 days to help with pain.  - amoxicillin (AMOXIL) 875 MG tablet; Take 1 Tablet by mouth 2 times a day for 5 days.  Dispense: 10 Tablet; Refill: 0    Instructed to return to Urgent Care or nearest Emergency Department if symptoms fail to improve, for any change in condition, further concerns, or new concerning symptoms. Patient states understanding of the plan of care and discharge instructions.    Federica Gregory M.D.

## 2022-11-22 ENCOUNTER — OFFICE VISIT (OUTPATIENT)
Dept: URGENT CARE | Facility: PHYSICIAN GROUP | Age: 47
End: 2022-11-22
Payer: COMMERCIAL

## 2022-11-22 VITALS
DIASTOLIC BLOOD PRESSURE: 79 MMHG | HEART RATE: 92 BPM | OXYGEN SATURATION: 98 % | TEMPERATURE: 97.9 F | RESPIRATION RATE: 18 BRPM | SYSTOLIC BLOOD PRESSURE: 122 MMHG

## 2022-11-22 DIAGNOSIS — L02.419 CELLULITIS AND ABSCESS OF UPPER EXTREMITY: ICD-10-CM

## 2022-11-22 DIAGNOSIS — L03.119 CELLULITIS AND ABSCESS OF UPPER EXTREMITY: ICD-10-CM

## 2022-11-22 DIAGNOSIS — M79.89 LEFT AXILLARY SWELLING: ICD-10-CM

## 2022-11-22 PROCEDURE — 99214 OFFICE O/P EST MOD 30 MIN: CPT | Performed by: PHYSICIAN ASSISTANT

## 2022-11-22 RX ORDER — SULFAMETHOXAZOLE AND TRIMETHOPRIM 800; 160 MG/1; MG/1
1 TABLET ORAL 2 TIMES DAILY
Qty: 10 TABLET | Refills: 0 | Status: SHIPPED | OUTPATIENT
Start: 2022-11-22 | End: 2022-11-27

## 2022-11-22 RX ORDER — AMOXICILLIN 500 MG/1
500 CAPSULE ORAL 3 TIMES DAILY
Qty: 15 CAPSULE | Refills: 0 | Status: SHIPPED | OUTPATIENT
Start: 2022-11-22 | End: 2022-11-27

## 2022-11-22 NOTE — PROGRESS NOTES
Subjective:   Ji Montoya is a 47 y.o. male who presents for Lump (Under left arm)     Patient presents with chief complaint of painful lump under left axilla, progressively getting worse.  First noticed 3 days ago.  Increased pain this morning, increased size.  No drainage.  No fevers or chills.  No malaise.  H/o DM, diet controlled.  No other constitutional symptoms.  No other noted lymphadenopathy.      Medications:  albuterol Aers    Allergies:             Patient has no known allergies.    Surgical History:         Past Surgical History:   Procedure Laterality Date    TONSILLECTOMY         Past Social Hx:  Ji Montoya  reports that he has quit smoking. His smoking use included cigarettes. He quit smokeless tobacco use about 6 years ago. He reports that he does not currently use alcohol. He reports current drug use. Drug: Marijuana.     Past Family Hx:   Ji Montoya family history includes Hyperlipidemia in his mother; Hypertension in his father.       Problem list, medications, and allergies reviewed by myself today in Epic.     Objective:     /79 (BP Location: Right arm, Patient Position: Sitting, BP Cuff Size: Adult)   Pulse 92   Temp 36.6 °C (97.9 °F) (Temporal)   Resp 18   SpO2 98%     Physical Exam  Musculoskeletal:        Arms:    Skin:     Findings: Lesion and rash present. No signs of injury. Rash is papular and pustular.             Comments: There is generalized erythema to the left axillary region.  There is tenderness to palpation.  There is soft fluctuant, not indurated, not circumscribed area of tenderness.  There are several papular/pustular lesions at the base of the hair follicles.  There is generalized warmth.  No streaking.       Assessment/Plan:     Diagnosis and Associated Orders:     1. Cellulitis and abscess of upper extremity  - amoxicillin (AMOXIL) 500 MG Cap; Take 1 Capsule by mouth 3 times a day for 5 days.  Dispense: 15 Capsule; Refill: 0  -  sulfamethoxazole-trimethoprim (BACTRIM DS) 800-160 MG tablet; Take 1 Tablet by mouth 2 times a day for 5 days.  Dispense: 10 Tablet; Refill: 0    2. Left axillary swelling  - US-EXTREMITY NON VASCULAR UNILATERAL LEFT; Future      Comments/MDM:  Patient presents with 4-day history of left axillary erythema and swelling.  Exam not consistent with hidradenitis suppurativa, it is without scarring.  There are areas of papular swelling at the base of the hair follicle with several lesions consistent with pustules.  I cannot palpate clear borders of the swelling.  It is not indurated.  I am not comfortable with any type of I&D at this time without further clarification of mass.  Cannot rule out lymphadenopathy.  At this time given surrounding erythema and papular pustular region with significant tenderness to palpation we will treat for cellulitis and abscess.  Will order axillary ultrasound.  Discussed indications for return including increased redness, pain, swelling, streaking, drainage, constitutional symptoms.  Recommend warm compresses to area.    I personally reviewed prior external notes and test results pertinent to today's visit.  Red flags discussed as well as indications to present to the Emergency Department.  Supportive care, natural history, differential diagnoses, and indications for immediate follow-up discussed.  Patient expresses understanding and agrees to plan.  Patient denies any other questions or concerns.    Follow-up with the primary care physician for recheck, reevaluation, and consideration of further management.      Please note that this dictation was created using voice recognition software. I have made a reasonable attempt to correct obvious errors, but I expect that there are errors of grammar and possibly content that I did not discover before finalizing the note.    This note was electronically signed by Indiana Brady PA-C

## 2023-01-26 ENCOUNTER — OFFICE VISIT (OUTPATIENT)
Dept: URGENT CARE | Facility: PHYSICIAN GROUP | Age: 48
End: 2023-01-26
Payer: COMMERCIAL

## 2023-01-26 VITALS
TEMPERATURE: 98.1 F | SYSTOLIC BLOOD PRESSURE: 122 MMHG | WEIGHT: 250 LBS | OXYGEN SATURATION: 95 % | BODY MASS INDEX: 41.65 KG/M2 | HEIGHT: 65 IN | RESPIRATION RATE: 20 BRPM | HEART RATE: 86 BPM | DIASTOLIC BLOOD PRESSURE: 84 MMHG

## 2023-01-26 DIAGNOSIS — R05.1 ACUTE COUGH: ICD-10-CM

## 2023-01-26 PROCEDURE — 99213 OFFICE O/P EST LOW 20 MIN: CPT | Performed by: PHYSICIAN ASSISTANT

## 2023-01-26 RX ORDER — BENZONATATE 100 MG/1
100 CAPSULE ORAL 3 TIMES DAILY PRN
Qty: 21 CAPSULE | Refills: 0 | Status: SHIPPED | OUTPATIENT
Start: 2023-01-26 | End: 2023-06-14

## 2023-01-26 RX ORDER — DEXTROMETHORPHAN HYDROBROMIDE AND PROMETHAZINE HYDROCHLORIDE 15; 6.25 MG/5ML; MG/5ML
5 SYRUP ORAL EVERY 4 HOURS PRN
Qty: 120 ML | Refills: 0 | Status: SHIPPED | OUTPATIENT
Start: 2023-01-26 | End: 2023-06-14

## 2023-01-26 NOTE — PROGRESS NOTES
"Subjective:   Ji Montoya is a 47 y.o. male who presents for Cough (Mucus x 3 days )      HPI  The patient presents to the Urgent Care with complaints of a cough onset 2 days ago.  He otherwise states he feels fine.  The cough is a productive cough with a wheeze.  History of asthma but not recently since moving out to [place].  History of pneumonia. Denies any fever, chills, nasal congestion, chest pain, shortness of breath, vomiting, diarrhea. History of diabetes.       Medications:    albuterol Aers    Allergies: Patient has no known allergies.    Problem List: Ji Montoya does not have any pertinent problems on file.    Surgical History:  Past Surgical History:   Procedure Laterality Date    TONSILLECTOMY         Past Social Hx: Ji Montoya  reports that he has quit smoking. His smoking use included cigarettes. He quit smokeless tobacco use about 7 years ago. He reports that he does not currently use alcohol. He reports current drug use. Drug: Marijuana.     Past Family Hx:  Ji Montoya family history includes Hyperlipidemia in his mother; Hypertension in his father.     Problem list, medications, and allergies reviewed by myself today in Epic.     Objective:     /84   Pulse 86   Temp 36.7 °C (98.1 °F) (Temporal)   Resp 20   Ht 1.651 m (5' 5\")   Wt 113 kg (250 lb)   SpO2 95%   BMI 41.60 kg/m²     Physical Exam  Vitals reviewed.   Constitutional:       General: He is not in acute distress.     Appearance: Normal appearance. He is not ill-appearing or toxic-appearing.   HENT:      Head: Normocephalic.      Mouth/Throat:      Mouth: Mucous membranes are moist.      Pharynx: Oropharynx is clear.   Eyes:      Conjunctiva/sclera: Conjunctivae normal.      Pupils: Pupils are equal, round, and reactive to light.   Cardiovascular:      Rate and Rhythm: Normal rate and regular rhythm.      Heart sounds: Normal heart sounds.   Pulmonary:      Effort: Pulmonary effort is " normal. No respiratory distress.      Breath sounds: Normal breath sounds. No wheezing, rhonchi or rales.   Musculoskeletal:      Cervical back: Neck supple.   Lymphadenopathy:      Cervical: No cervical adenopathy.   Skin:     General: Skin is warm and dry.   Neurological:      General: No focal deficit present.      Mental Status: He is alert and oriented to person, place, and time.   Psychiatric:         Mood and Affect: Mood normal.         Behavior: Behavior normal.       Diagnosis and associated orders:     1. Acute cough  - benzonatate (TESSALON) 100 MG Cap; Take 1 Capsule by mouth 3 times a day as needed for Cough.  Dispense: 21 Capsule; Refill: 0  - promethazine-dextromethorphan (PROMETHAZINE-DM) 6.25-15 MG/5ML syrup; Take 5 mL by mouth every four hours as needed for Cough.  Dispense: 120 mL; Refill: 0       Comments/MDM:     Normal examination.  Viral versus allergic etiology.  We will start medications as above.  Encouraged over-the-counter medications cough medicine during the day, nasal saline washes, Flonase nasal spray, Zyrtec or Claritin every day.  May use albuterol inhaler if any coughing attacks or wheezing.  Overall, the patient is well-appearing in no acute distress.  Normal vital signs.  Lungs CTA bilaterally.  Antibiotics are not indicated at this time.       I personally reviewed prior external notes and test results pertinent to today's visit. Pathogenesis of diagnosis discussed including typical length and natural progression. Supportive care, natural history, differential diagnoses, and indications for immediate follow-up discussed. Patient expresses understanding and agrees to plan. Patient denies any other questions or concerns.     Follow-up with the primary care physician for recheck, reevaluation, and consideration of further management.    Please note that this dictation was created using voice recognition software. I have made a reasonable attempt to correct obvious errors, but I  expect that there are errors of grammar and possibly content that I did not discover before finalizing the note.    This note was electronically signed by Newton Bourne PA-C

## 2023-06-14 ENCOUNTER — OFFICE VISIT (OUTPATIENT)
Dept: URGENT CARE | Facility: PHYSICIAN GROUP | Age: 48
End: 2023-06-14
Payer: COMMERCIAL

## 2023-06-14 VITALS
HEART RATE: 85 BPM | HEIGHT: 68 IN | WEIGHT: 240 LBS | TEMPERATURE: 97.1 F | SYSTOLIC BLOOD PRESSURE: 132 MMHG | RESPIRATION RATE: 18 BRPM | BODY MASS INDEX: 36.37 KG/M2 | OXYGEN SATURATION: 97 % | DIASTOLIC BLOOD PRESSURE: 86 MMHG

## 2023-06-14 DIAGNOSIS — H01.004 BLEPHARITIS OF LEFT UPPER EYELID, UNSPECIFIED TYPE: ICD-10-CM

## 2023-06-14 PROCEDURE — 3079F DIAST BP 80-89 MM HG: CPT | Performed by: REGISTERED NURSE

## 2023-06-14 PROCEDURE — 99213 OFFICE O/P EST LOW 20 MIN: CPT | Performed by: REGISTERED NURSE

## 2023-06-14 PROCEDURE — 3075F SYST BP GE 130 - 139MM HG: CPT | Performed by: REGISTERED NURSE

## 2023-06-14 RX ORDER — ERYTHROMYCIN 5 MG/G
1 OINTMENT OPHTHALMIC 4 TIMES DAILY
Qty: 3.5 G | Refills: 1 | Status: SHIPPED | OUTPATIENT
Start: 2023-06-14 | End: 2023-06-24

## 2023-06-14 ASSESSMENT — ENCOUNTER SYMPTOMS
DIZZINESS: 0
EYE REDNESS: 1
DOUBLE VISION: 0
HEADACHES: 0
BLURRED VISION: 0
EYE PAIN: 0
FEVER: 0
LOSS OF CONSCIOUSNESS: 0
EYE DISCHARGE: 1
COUGH: 0
PALPITATIONS: 0
TINGLING: 0
SEIZURES: 0
SHORTNESS OF BREATH: 0
ABDOMINAL PAIN: 0
SENSORY CHANGE: 0
CHILLS: 0
PHOTOPHOBIA: 0
FOCAL WEAKNESS: 0

## 2023-06-14 ASSESSMENT — VISUAL ACUITY: OU: 1

## 2023-06-14 NOTE — PROGRESS NOTES
"Subjective:   Ji Montoya is a 48 y.o. male who presents for Eye Problem (Left eye, px, swelling, crust in corner of eye when he woke up, started this AM)    HPI:  2 days of left upper eyelid being puffy with some drainage that is dried to the lashes.  Eye feels somewhat itchy but no foreign body sensation.  Did wake up with some crust in the left corner of his eye.  Does not wear contacts.  No recent illness.  No known exposures.    Denies eye pain, photophobia, foreign body sensation, bright flashing lights, acute vision changes, fever, neck pain    Review of Systems   Constitutional:  Negative for chills and fever.   HENT:  Negative for ear pain.    Eyes:  Positive for discharge and redness. Negative for blurred vision, double vision, photophobia and pain.   Respiratory:  Negative for cough and shortness of breath.    Cardiovascular:  Negative for chest pain, palpitations and leg swelling.   Gastrointestinal:  Negative for abdominal pain.   Skin:  Negative for rash.   Neurological:  Negative for dizziness, tingling, sensory change, focal weakness, seizures, loss of consciousness and headaches.       Medications, Allergies, and current problem list reviewed today in Epic.     Objective:     /86   Pulse 85   Temp 36.2 °C (97.1 °F) (Temporal)   Resp 18   Ht 1.727 m (5' 8\")   Wt 109 kg (240 lb)   SpO2 97%     Physical Exam  Vitals and nursing note reviewed.   Constitutional:       General: He is not in acute distress.     Appearance: Normal appearance. He is well-developed. He is not ill-appearing, toxic-appearing or diaphoretic.   HENT:      Head: Normocephalic and atraumatic. No right periorbital erythema or left periorbital erythema.      Right Ear: Tympanic membrane, ear canal and external ear normal.      Left Ear: Tympanic membrane, ear canal and external ear normal.      Nose: Nose normal. No congestion or rhinorrhea.      Mouth/Throat:      Mouth: Mucous membranes are moist.      " Pharynx: Oropharynx is clear. No oropharyngeal exudate or posterior oropharyngeal erythema.   Eyes:      General: Lids are normal. Lids are everted, no foreign bodies appreciated. Vision grossly intact. Gaze aligned appropriately. No scleral icterus.        Right eye: No discharge.         Left eye: Discharge (dried to upper lashes) present.     Conjunctiva/sclera:      Right eye: Right conjunctiva is not injected. No chemosis, exudate or hemorrhage.     Left eye: Left conjunctiva is injected. No chemosis or hemorrhage.  Cardiovascular:      Rate and Rhythm: Normal rate and regular rhythm.      Pulses: Normal pulses.      Heart sounds: Normal heart sounds. No murmur heard.  Pulmonary:      Effort: Pulmonary effort is normal. No respiratory distress.      Breath sounds: Normal breath sounds.   Musculoskeletal:         General: No swelling or tenderness.      Cervical back: Normal range of motion and neck supple.      Right lower leg: No edema.      Left lower leg: No edema.   Lymphadenopathy:      Cervical: No cervical adenopathy.   Skin:     General: Skin is warm and dry.   Neurological:      General: No focal deficit present.      Mental Status: He is alert and oriented to person, place, and time. Mental status is at baseline.   Psychiatric:         Mood and Affect: Mood normal.         Behavior: Behavior normal.         Thought Content: Thought content normal.         Judgment: Judgment normal.         Assessment/Plan:     Diagnosis and associated orders:     1. Blepharitis of left upper eyelid, unspecified type  erythromycin 5 MG/GM Ointment           Comments/MDM:     Vital Signs within normal limits, nontoxic appearance, no red flag signs or symptoms  Left upper lid slightly swollen at border, dried drainage in lashes, conjunctival injection  Vision grossly intact  Antibiotic eye ointment  Good hand hygiene  Arash and arash baby soap  Adequate hydration  Follow up with primary care provider and eye doctor          Differential diagnosis, natural history, supportive care, and indications for immediate follow-up discussed.    Return to clinic or go to ED if symptoms worsen or persist. Indications for ED discussed at length. Patient/Parent/Guardian voices understanding. Follow-up with your primary care provider in 3-5 days. Red flag symptoms discussed. All side effects of medication discussed including allergic response, GI upset, tendon injury, rash, sedation etc.    I personally reviewed prior external notes and test results pertinent to today's visit as well as additional imaging and testing completed in clinic today.     Please note that this dictation was created using voice recognition software. I have made every reasonable attempt to correct obvious errors, but I expect that there are errors of grammar and possibly content that I did not discover before finalizing the note.    This note was electronically signed by BHARAT Gilmore

## 2023-06-14 NOTE — PATIENT INSTRUCTIONS
Blepharitis  Blepharitis is swelling of the eyelids. Symptoms may include:  Reddish, scaly skin around the scalp and eyebrows.  Burning or itching of the eyelids.  Fluid coming from the eye at night. This causes the eyelashes to stick together in the morning.  Eyelashes that fall out.  Being sensitive to light.  Follow these instructions at home:  Pay attention to any changes in how you look or feel. Tell your health care provider about any changes. Follow these instructions to help with your condition:  Keeping clean    Wash your hands often.  Wash your eyelids with warm water, or wash them with warm water that is mixed with little bit of baby shampoo. Do this 2 or more times per day.  Wash your face and eyebrows at least once a day.  Use a clean towel each time you dry your eyelids. Do not use the towel to clean or dry other areas of your body. Do not share your towel with anyone.  General instructions  Avoid wearing makeup until you get better. Do not share makeup with anyone.  Avoid rubbing your eyes.  Put a warm compress on your eyes 2 times per day for 10 minutes at a time, or as told by your doctor.  If you were given antibiotics in the form of creams or eye drops, use the medicine as told by your doctor. Do not stop using the medicine even if you feel better.  Keep all follow-up visits as told by your doctor. This is important.  Contact a doctor if:  Your eyelids feel hot.  You have blisters on your eyelids.  You have a rash on your eyelids.  The swelling does not go away in 2-4 days.  The swelling gets worse.  Get help right away if:  You have pain that gets worse.  You have pain that spreads to other parts of your face.  You have redness that gets worse.  You have redness that spreads to other parts of your face.  Your vision changes.  You have pain when you look at lights or things that move.  You have a fever.  Summary  Blepharitis is swelling of the eyelids.  Pay attention to any changes in how your  eyes look or feel. Tell your doctor about any changes.  Follow home care instructions as told by your doctor. Wash your hands often. Avoid wearing makeup. Do not rub your eyes.  Use warm compresses, creams, or eye drops as told by your doctor.  Let your doctor know if you have changes in vision, blisters or rash on eyelids, pain that spreads to your face, or warmth on your eyelids.  This information is not intended to replace advice given to you by your health care provider. Make sure you discuss any questions you have with your health care provider.  Document Released: 09/26/2009 Document Revised: 06/17/2019 Document Reviewed: 06/17/2019  [employer] Patient Education © 2020 [employer]